# Patient Record
Sex: FEMALE | Race: WHITE | NOT HISPANIC OR LATINO | Employment: FULL TIME | URBAN - METROPOLITAN AREA
[De-identification: names, ages, dates, MRNs, and addresses within clinical notes are randomized per-mention and may not be internally consistent; named-entity substitution may affect disease eponyms.]

---

## 2020-07-04 ENCOUNTER — HOSPITAL ENCOUNTER (EMERGENCY)
Facility: HOSPITAL | Age: 35
Discharge: HOME | End: 2020-07-05
Attending: EMERGENCY MEDICINE | Admitting: EMERGENCY MEDICINE

## 2020-07-04 ENCOUNTER — APPOINTMENT (OUTPATIENT)
Dept: CT IMAGING | Facility: HOSPITAL | Age: 35
End: 2020-07-04

## 2020-07-04 ENCOUNTER — APPOINTMENT (OUTPATIENT)
Dept: GENERAL RADIOLOGY | Facility: HOSPITAL | Age: 35
End: 2020-07-04

## 2020-07-04 DIAGNOSIS — M79.89 LEFT LEG SWELLING: Primary | ICD-10-CM

## 2020-07-04 LAB
ALBUMIN SERPL-MCNC: 3.8 G/DL (ref 3.5–5.2)
ALBUMIN/GLOB SERPL: 1.4 G/DL
ALP SERPL-CCNC: 95 U/L (ref 39–117)
ALT SERPL W P-5'-P-CCNC: 118 U/L (ref 1–33)
ANION GAP SERPL CALCULATED.3IONS-SCNC: 11.8 MMOL/L (ref 5–15)
AST SERPL-CCNC: 61 U/L (ref 1–32)
BASOPHILS # BLD MANUAL: 0.05 10*3/MM3 (ref 0–0.2)
BASOPHILS NFR BLD AUTO: 1 % (ref 0–1.5)
BILIRUB SERPL-MCNC: 0.4 MG/DL (ref 0.2–1.2)
BUN SERPL-MCNC: 26 MG/DL (ref 6–20)
BUN/CREAT SERPL: 19.5 (ref 7–25)
CALCIUM SPEC-SCNC: 8.3 MG/DL (ref 8.6–10.5)
CHLORIDE SERPL-SCNC: 101 MMOL/L (ref 98–107)
CO2 SERPL-SCNC: 25.2 MMOL/L (ref 22–29)
CREAT SERPL-MCNC: 1.33 MG/DL (ref 0.57–1)
DEPRECATED RDW RBC AUTO: 48.6 FL (ref 37–54)
ERYTHROCYTE [DISTWIDTH] IN BLOOD BY AUTOMATED COUNT: 13.7 % (ref 12.3–15.4)
GFR SERPL CREATININE-BSD FRML MDRD: 45 ML/MIN/1.73
GLOBULIN UR ELPH-MCNC: 2.7 GM/DL
GLUCOSE SERPL-MCNC: 121 MG/DL (ref 65–99)
HCT VFR BLD AUTO: 29.9 % (ref 34–46.6)
HGB BLD-MCNC: 10.3 G/DL (ref 12–15.9)
HOLD SPECIMEN: NORMAL
HOLD SPECIMEN: NORMAL
LYMPHOCYTES # BLD MANUAL: 0.93 10*3/MM3 (ref 0.7–3.1)
LYMPHOCYTES NFR BLD MANUAL: 19 % (ref 19.6–45.3)
LYMPHOCYTES NFR BLD MANUAL: 6 % (ref 5–12)
MCH RBC QN AUTO: 33.3 PG (ref 26.6–33)
MCHC RBC AUTO-ENTMCNC: 34.4 G/DL (ref 31.5–35.7)
MCV RBC AUTO: 96.8 FL (ref 79–97)
MONOCYTES # BLD AUTO: 0.29 10*3/MM3 (ref 0.1–0.9)
NEUTROPHILS # BLD AUTO: 3.61 10*3/MM3 (ref 1.7–7)
NEUTROPHILS NFR BLD MANUAL: 74 % (ref 42.7–76)
NT-PROBNP SERPL-MCNC: 407.5 PG/ML (ref 5–450)
PLAT MORPH BLD: NORMAL
PLATELET # BLD AUTO: 230 10*3/MM3 (ref 140–450)
PMV BLD AUTO: 11.7 FL (ref 6–12)
POTASSIUM SERPL-SCNC: 4.1 MMOL/L (ref 3.5–5.2)
PROT SERPL-MCNC: 6.5 G/DL (ref 6–8.5)
RBC # BLD AUTO: 3.09 10*6/MM3 (ref 3.77–5.28)
RBC MORPH BLD: NORMAL
SODIUM SERPL-SCNC: 138 MMOL/L (ref 136–145)
TROPONIN T SERPL-MCNC: <0.01 NG/ML (ref 0–0.03)
WBC # BLD AUTO: 4.88 10*3/MM3 (ref 3.4–10.8)
WBC MORPH BLD: NORMAL
WHOLE BLOOD HOLD SPECIMEN: NORMAL
WHOLE BLOOD HOLD SPECIMEN: NORMAL

## 2020-07-04 PROCEDURE — 25010000002 HYDROMORPHONE PER 4 MG: Performed by: EMERGENCY MEDICINE

## 2020-07-04 PROCEDURE — 93005 ELECTROCARDIOGRAM TRACING: CPT | Performed by: EMERGENCY MEDICINE

## 2020-07-04 PROCEDURE — 96375 TX/PRO/DX INJ NEW DRUG ADDON: CPT

## 2020-07-04 PROCEDURE — 71275 CT ANGIOGRAPHY CHEST: CPT

## 2020-07-04 PROCEDURE — 93010 ELECTROCARDIOGRAM REPORT: CPT | Performed by: INTERNAL MEDICINE

## 2020-07-04 PROCEDURE — 96374 THER/PROPH/DIAG INJ IV PUSH: CPT

## 2020-07-04 PROCEDURE — 85025 COMPLETE CBC W/AUTO DIFF WBC: CPT | Performed by: EMERGENCY MEDICINE

## 2020-07-04 PROCEDURE — 99283 EMERGENCY DEPT VISIT LOW MDM: CPT

## 2020-07-04 PROCEDURE — 85007 BL SMEAR W/DIFF WBC COUNT: CPT | Performed by: EMERGENCY MEDICINE

## 2020-07-04 PROCEDURE — 0 IOPAMIDOL PER 1 ML: Performed by: EMERGENCY MEDICINE

## 2020-07-04 PROCEDURE — 71045 X-RAY EXAM CHEST 1 VIEW: CPT

## 2020-07-04 PROCEDURE — 83880 ASSAY OF NATRIURETIC PEPTIDE: CPT | Performed by: EMERGENCY MEDICINE

## 2020-07-04 PROCEDURE — 80053 COMPREHEN METABOLIC PANEL: CPT | Performed by: EMERGENCY MEDICINE

## 2020-07-04 PROCEDURE — 84484 ASSAY OF TROPONIN QUANT: CPT | Performed by: EMERGENCY MEDICINE

## 2020-07-04 PROCEDURE — 25010000002 ONDANSETRON PER 1 MG: Performed by: EMERGENCY MEDICINE

## 2020-07-04 RX ORDER — NAPROXEN 500 MG/1
400 TABLET ORAL 2 TIMES DAILY WITH MEALS
COMMUNITY
End: 2023-01-25

## 2020-07-04 RX ORDER — HYDROMORPHONE HYDROCHLORIDE 1 MG/ML
0.5 INJECTION, SOLUTION INTRAMUSCULAR; INTRAVENOUS; SUBCUTANEOUS ONCE
Status: COMPLETED | OUTPATIENT
Start: 2020-07-04 | End: 2020-07-04

## 2020-07-04 RX ORDER — OXYCODONE AND ACETAMINOPHEN 10; 325 MG/1; MG/1
1 TABLET ORAL EVERY 6 HOURS PRN
COMMUNITY
End: 2021-06-29

## 2020-07-04 RX ORDER — ASPIRIN 81 MG/1
81 TABLET ORAL DAILY
COMMUNITY
End: 2021-06-29

## 2020-07-04 RX ORDER — SODIUM CHLORIDE 0.9 % (FLUSH) 0.9 %
10 SYRINGE (ML) INJECTION AS NEEDED
Status: DISCONTINUED | OUTPATIENT
Start: 2020-07-04 | End: 2020-07-05 | Stop reason: HOSPADM

## 2020-07-04 RX ORDER — CEPHALEXIN 250 MG/1
250 CAPSULE ORAL 4 TIMES DAILY
COMMUNITY
End: 2021-06-29

## 2020-07-04 RX ORDER — ONDANSETRON 2 MG/ML
4 INJECTION INTRAMUSCULAR; INTRAVENOUS ONCE
Status: COMPLETED | OUTPATIENT
Start: 2020-07-04 | End: 2020-07-04

## 2020-07-04 RX ORDER — LEVOTHYROXINE SODIUM 0.05 MG/1
50 TABLET ORAL DAILY
COMMUNITY
End: 2023-01-25

## 2020-07-04 RX ADMIN — IOPAMIDOL 95 ML: 755 INJECTION, SOLUTION INTRAVENOUS at 23:25

## 2020-07-04 RX ADMIN — HYDROMORPHONE HYDROCHLORIDE 0.5 MG: 1 INJECTION, SOLUTION INTRAMUSCULAR; INTRAVENOUS; SUBCUTANEOUS at 23:34

## 2020-07-04 RX ADMIN — ONDANSETRON 4 MG: 2 INJECTION INTRAMUSCULAR; INTRAVENOUS at 23:32

## 2020-07-05 VITALS
TEMPERATURE: 97 F | BODY MASS INDEX: 33.55 KG/M2 | HEIGHT: 60 IN | WEIGHT: 170.9 LBS | OXYGEN SATURATION: 100 % | SYSTOLIC BLOOD PRESSURE: 129 MMHG | HEART RATE: 92 BPM | DIASTOLIC BLOOD PRESSURE: 85 MMHG | RESPIRATION RATE: 16 BRPM

## 2020-07-05 PROCEDURE — 96372 THER/PROPH/DIAG INJ SC/IM: CPT

## 2020-07-05 PROCEDURE — 25010000002 FUROSEMIDE PER 20 MG: Performed by: EMERGENCY MEDICINE

## 2020-07-05 PROCEDURE — 96375 TX/PRO/DX INJ NEW DRUG ADDON: CPT

## 2020-07-05 PROCEDURE — 25010000002 ENOXAPARIN PER 10 MG: Performed by: EMERGENCY MEDICINE

## 2020-07-05 RX ORDER — FUROSEMIDE 10 MG/ML
20 INJECTION INTRAMUSCULAR; INTRAVENOUS ONCE
Status: COMPLETED | OUTPATIENT
Start: 2020-07-05 | End: 2020-07-05

## 2020-07-05 RX ORDER — FUROSEMIDE 20 MG/1
20 TABLET ORAL DAILY
Qty: 5 TABLET | Refills: 0 | Status: SHIPPED | OUTPATIENT
Start: 2020-07-05 | End: 2021-06-29

## 2020-07-05 RX ADMIN — ENOXAPARIN SODIUM 80 MG: 80 INJECTION SUBCUTANEOUS at 00:15

## 2020-07-05 RX ADMIN — FUROSEMIDE 20 MG: 10 INJECTION, SOLUTION INTRAMUSCULAR; INTRAVENOUS at 00:15

## 2020-07-05 NOTE — ED PROVIDER NOTES
" EMERGENCY DEPARTMENT ENCOUNTER    Room Number:  16  Date of encounter:  2020  PCP: Moraima Goyal PA  Historian: patient      HPI:  Chief Complaint: knee pain and swelling  Context: Stephanie Appiah is a 35 y.o. female who presents to the ED c/o a marked change to L knee pain and swelling that began earlier today. Per the pt, she underwent ACL surgery by Dr. Heart 3 days ago. She states that her pain was as expected until earlier today when the pain worsened and she began to notice a large amount of swelling. She states \"I gained about 17 pounds in just a few hours, it also feels really tight in my abd and in my hand\". Confirms mild SOA. Denies CP, fever, chills, nausea, vomiting, and other pain. Currently on 81 mg ASA, but no other blood thinners. There are no other complaints.     Patient was placed in face mask in first look. Patient was wearing facemask when I entered the room and throughout our encounter. I wore full protective equipment throughout this patient encounter including a face mask, and gloves. Hand hygiene was performed before donning protective equipment and after removal when leaving the room.    PAST MEDICAL HISTORY  Active Ambulatory Problems     Diagnosis Date Noted   • No Active Ambulatory Problems     Resolved Ambulatory Problems     Diagnosis Date Noted   • No Resolved Ambulatory Problems     Past Medical History:   Diagnosis Date   • ADHD    • Disease of thyroid gland          PAST SURGICAL HISTORY  Past Surgical History:   Procedure Laterality Date   • BREAST AUGMENTATION     •  SECTION     • KNEE SURGERY           FAMILY HISTORY  Family History   Problem Relation Age of Onset   • Ovarian cancer Maternal Aunt    • Breast cancer Maternal Grandmother    • No Known Problems Mother    • No Known Problems Father          SOCIAL HISTORY  Social History     Socioeconomic History   • Marital status:      Spouse name: Not on file   • Number of children: 1   • Years " of education: Not on file   • Highest education level: Not on file   Occupational History   • Occupation: teacher   Tobacco Use   • Smoking status: Never Smoker   • Smokeless tobacco: Never Used   Substance and Sexual Activity   • Alcohol use: Yes     Comment: occ   • Drug use: No   • Sexual activity: Yes     Partners: Male         ALLERGIES  Insulin detemir        REVIEW OF SYSTEMS  Review of Systems   Constitutional: Positive for unexpected weight change (gain of 17 pounds in a few hours). Negative for fever.   HENT: Negative for sore throat.    Eyes: Negative.    Respiratory: Positive for shortness of breath. Negative for cough.    Cardiovascular: Negative for chest pain.   Gastrointestinal: Negative for abdominal pain, diarrhea and vomiting.   Genitourinary: Negative for dysuria.   Musculoskeletal: Negative for neck pain.        (+) L knee pain and swelling   Skin: Negative for rash.   Allergic/Immunologic: Negative.    Neurological: Negative for weakness, numbness and headaches.   Hematological: Negative.    Psychiatric/Behavioral: Negative.    All other systems reviewed and are negative.     All systems reviewed and negative except for those discussed in HPI.       PHYSICAL EXAM    I have reviewed the triage vital signs and nursing notes.    ED Triage Vitals   Temp Heart Rate Resp BP SpO2   07/04/20 2059 07/04/20 2059 07/04/20 2059 07/04/20 2130 07/04/20 2059   97 °F (36.1 °C) 100 16 134/84 100 %      Temp src Heart Rate Source Patient Position BP Location FiO2 (%)   07/04/20 2059 07/04/20 2059 -- -- --   Tympanic Monitor            Physical Exam   Constitutional: She is oriented to person, place, and time. No distress.   HENT:   Head: Normocephalic and atraumatic.   Eyes: Pupils are equal, round, and reactive to light. EOM are normal.   Neck: Normal range of motion. Neck supple.   Cardiovascular: Normal rate, regular rhythm and normal heart sounds.   Pulmonary/Chest: Effort normal and breath sounds normal. No  respiratory distress.   Abdominal: Soft. There is no tenderness. There is no rebound and no guarding.   Musculoskeletal: She exhibits no edema.        Left knee: She exhibits decreased range of motion and swelling.   Mild swelling of the LLE. The anterior surgical incision of the knee appears to be intact without drainage or erythema. The distal sensation is intact.    Neurological: She is alert and oriented to person, place, and time. She has normal sensation and normal strength.   Skin: Skin is warm and dry. No rash noted.   Psychiatric: Mood and affect normal.   Nursing note and vitals reviewed.      LAB RESULTS  Recent Results (from the past 24 hour(s))   Comprehensive Metabolic Panel    Collection Time: 07/04/20 10:04 PM   Result Value Ref Range    Glucose 121 (H) 65 - 99 mg/dL    BUN 26 (H) 6 - 20 mg/dL    Creatinine 1.33 (H) 0.57 - 1.00 mg/dL    Sodium 138 136 - 145 mmol/L    Potassium 4.1 3.5 - 5.2 mmol/L    Chloride 101 98 - 107 mmol/L    CO2 25.2 22.0 - 29.0 mmol/L    Calcium 8.3 (L) 8.6 - 10.5 mg/dL    Total Protein 6.5 6.0 - 8.5 g/dL    Albumin 3.80 3.50 - 5.20 g/dL    ALT (SGPT) 118 (H) 1 - 33 U/L    AST (SGOT) 61 (H) 1 - 32 U/L    Alkaline Phosphatase 95 39 - 117 U/L    Total Bilirubin 0.4 0.2 - 1.2 mg/dL    eGFR Non African Amer 45 (L) >60 mL/min/1.73    Globulin 2.7 gm/dL    A/G Ratio 1.4 g/dL    BUN/Creatinine Ratio 19.5 7.0 - 25.0    Anion Gap 11.8 5.0 - 15.0 mmol/L   BNP    Collection Time: 07/04/20 10:04 PM   Result Value Ref Range    proBNP 407.5 5.0 - 450.0 pg/mL   Troponin    Collection Time: 07/04/20 10:04 PM   Result Value Ref Range    Troponin T <0.010 0.000 - 0.030 ng/mL   Light Blue Top    Collection Time: 07/04/20 10:04 PM   Result Value Ref Range    Extra Tube hold for add-on    Green Top (Gel)    Collection Time: 07/04/20 10:04 PM   Result Value Ref Range    Extra Tube Hold for add-ons.    Lavender Top    Collection Time: 07/04/20 10:04 PM   Result Value Ref Range    Extra Tube hold  for add-on    Gold Top - SST    Collection Time: 07/04/20 10:04 PM   Result Value Ref Range    Extra Tube Hold for add-ons.    CBC Auto Differential    Collection Time: 07/04/20 10:04 PM   Result Value Ref Range    WBC 4.88 3.40 - 10.80 10*3/mm3    RBC 3.09 (L) 3.77 - 5.28 10*6/mm3    Hemoglobin 10.3 (L) 12.0 - 15.9 g/dL    Hematocrit 29.9 (L) 34.0 - 46.6 %    MCV 96.8 79.0 - 97.0 fL    MCH 33.3 (H) 26.6 - 33.0 pg    MCHC 34.4 31.5 - 35.7 g/dL    RDW 13.7 12.3 - 15.4 %    RDW-SD 48.6 37.0 - 54.0 fl    MPV 11.7 6.0 - 12.0 fL    Platelets 230 140 - 450 10*3/mm3   Manual Differential    Collection Time: 07/04/20 10:04 PM   Result Value Ref Range    Neutrophil % 74.0 42.7 - 76.0 %    Lymphocyte % 19.0 (L) 19.6 - 45.3 %    Monocyte % 6.0 5.0 - 12.0 %    Basophil % 1.0 0.0 - 1.5 %    Neutrophils Absolute 3.61 1.70 - 7.00 10*3/mm3    Lymphocytes Absolute 0.93 0.70 - 3.10 10*3/mm3    Monocytes Absolute 0.29 0.10 - 0.90 10*3/mm3    Basophils Absolute 0.05 0.00 - 0.20 10*3/mm3    RBC Morphology Normal Normal    WBC Morphology Normal Normal    Platelet Morphology Normal Normal       Ordered the above labs and independently reviewed the results.        RADIOLOGY  Xr Chest 1 View    Result Date: 7/4/2020  PORTABLE CHEST 07/04/2020 AT 10:17 PM  CLINICAL HISTORY: Dyspnea  The lungs are fairly well-expanded and appear free of acute infiltrates. There are no pleural effusions. The cardiomediastinal silhouette is unremarkable.  IMPRESSIONS: No evidence of acute disease within the chest.  This report was finalized on 7/4/2020 10:54 PM by Dr. Dylan Lei M.D.      Ct Angiogram Chest    Result Date: 7/4/2020  CT ANGIOGRAM CHEST-  CLINICAL HISTORY: Dyspnea. Leg swelling. Recent knee surgery.  TECHNIQUE: Spiral CT images were obtained through the chest during rapid IV injection of contrast and were reconstructed in 2 mm thick axial slices. Multiple coronal and sagittal and 3-D reconstructions were produced.  Radiation dose reduction  techniques were utilized, including automated exposure control and exposure modulation based on body size.  COMPARISON: None  FINDINGS: The main pulmonary arteries and their lobar and segmental branches are well opacified, and appear within normal limits. There is no CT evidence of pulmonary embolism. The thoracic aorta is also well opacified and appears within normal limits. There is no mediastinal or hilar or axillary lymphadenopathy. Lung window images demonstrate no parenchymal masses or infiltrates. There are no pleural effusions. Images through the upper abdomen demonstrate no abnormalities.      Negative CTA of the chest with PE protocol.  This report was finalized on 7/4/2020 11:55 PM by Dr. Dylan Lei M.D.        I ordered the above noted radiological studies. Reviewed by me.  See dictation for official radiology interpretation.      PROCEDURES    Procedures    EKG          EKG time: 2213  Rhythm/Rate: NSR 84  P waves and MO: nml  QRS, axis: nml   ST and T waves: nml     Interpreted Contemporaneously by me, independently viewed  No prior       MEDICATIONS GIVEN IN ER    Medications   HYDROmorphone (DILAUDID) injection 0.5 mg (0.5 mg Intravenous Given 7/4/20 2334)   ondansetron (ZOFRAN) injection 4 mg (4 mg Intravenous Given 7/4/20 2332)   iopamidol (ISOVUE-370) 76 % injection 100 mL (95 mL Intravenous Given by Other 7/4/20 2325)   furosemide (LASIX) injection 20 mg (20 mg Intravenous Given 7/5/20 0015)   enoxaparin (LOVENOX) syringe 80 mg (80 mg Subcutaneous Given 7/5/20 0015)         PROGRESS, DATA ANALYSIS, CONSULTS, AND MEDICAL DECISION MAKING    All labs have been independently reviewed by me.  All radiology studies have been reviewed by me and discussed with radiologist dictating the report.   EKG's independently viewed and interpreted by me.  Discussion below represents my analysis of pertinent findings related to patient's condition, differential diagnosis, treatment plan and final  disposition.         0004- Rechecked pt. Pt is resting comfortably. Her pain is greatly improved. She also feels like her weight and swelling are improving. Notified pt of her labs, CXR, EKG, and CTA chest results; all of which are unremarkable. Given that I cannot obtain a Doppler of her LLE at this time, I informed the pt I will be giving her a dose of Lovenox tonight and will then discharge her to f/u to get the doppler tomorrow. Discussed the plan to discharge the pt home with prescriptions for Lasix. I instructed the pt to f/u for her doppler tomorrow.. RTED instructions given. Pt understands and agrees with the plan, all questions answered.    --  AS OF 06:29 VITALS:    BP - 129/85  HR - 92  TEMP - 97 °F (36.1 °C) (Tympanic)  O2 SATS - 100%  --    DIAGNOSIS  Final diagnoses:   Left leg swelling         DISPOSITION  DISCHARGE    Patient discharged in stable condition.    Reviewed implications of results, diagnosis, meds, responsibility to follow up, warning signs and symptoms of possible worsening, potential complications and reasons to return to ER.    Patient/Family voiced understanding of above instructions.    Discussed plan for discharge, as there is no emergent indication for admission. Patient referred to primary care provider for BP management due to today's BP. Pt/family is agreeable and understands need for follow up and repeat testing.  Pt is aware that discharge does not mean that nothing is wrong but it indicates no emergency is present that requires admission and they must continue care with follow-up as given below or physician of their choice.     FOLLOW-UP  oMraima Goyal PA  [address] [place] [id number] [phone]    Schedule an appointment as soon as possible for a visit            Medication List      New Prescriptions    furosemide 20 MG tablet  Commonly known as:  LASIX  Take 1 tablet by mouth Daily.        --  Documentation assistance provided by gerber Warren  Caleb for Dr. Татьяна MD.  Information recorded by the scribe was done at my direction and has been verified and validated by me.       Kelvin Ellis  07/05/20 0011       Jl Vaz MD  07/05/20 0629

## 2020-07-05 NOTE — ED NOTES
"Pt placed in mask upon arrival, staff in PPE. Pt reports increased pain in left knee s/p sx 3 days ago. Pt says she has 17 pound weight gain \"in a matter of just a few hours\". Increase in pain and she states she is SOB. Dr. Armin Han was surgeon.     Marcie Ward RN  07/04/20 2054    "

## 2021-04-16 ENCOUNTER — BULK ORDERING (OUTPATIENT)
Dept: CASE MANAGEMENT | Facility: OTHER | Age: 36
End: 2021-04-16

## 2021-04-16 DIAGNOSIS — Z23 IMMUNIZATION DUE: ICD-10-CM

## 2021-06-29 ENCOUNTER — OFFICE VISIT (OUTPATIENT)
Dept: FAMILY MEDICINE CLINIC | Facility: CLINIC | Age: 36
End: 2021-06-29

## 2021-06-29 VITALS
HEIGHT: 60 IN | HEART RATE: 98 BPM | TEMPERATURE: 100 F | OXYGEN SATURATION: 95 % | BODY MASS INDEX: 27.09 KG/M2 | SYSTOLIC BLOOD PRESSURE: 132 MMHG | WEIGHT: 138 LBS | DIASTOLIC BLOOD PRESSURE: 72 MMHG

## 2021-06-29 DIAGNOSIS — E03.9 HYPOTHYROIDISM, UNSPECIFIED TYPE: Chronic | ICD-10-CM

## 2021-06-29 DIAGNOSIS — F98.8 ATTENTION DEFICIT DISORDER (ADD) IN ADULT: Primary | Chronic | ICD-10-CM

## 2021-06-29 DIAGNOSIS — R00.0 TACHYCARDIA: ICD-10-CM

## 2021-06-29 PROBLEM — O14.10 PREECLAMPSIA, SEVERE: Status: ACTIVE | Noted: 2017-06-10

## 2021-06-29 PROBLEM — O13.9 PIH (PREGNANCY INDUCED HYPERTENSION): Status: ACTIVE | Noted: 2017-06-06

## 2021-06-29 PROCEDURE — 99204 OFFICE O/P NEW MOD 45 MIN: CPT | Performed by: FAMILY MEDICINE

## 2021-06-29 NOTE — PATIENT INSTRUCTIONS
For the tachycardia or high heart rate, will review records from her the previous provider's office including EKGs.  We will need to review old records for ADD testing and repeat them if they are not available.  Follow-up in 3 months for physical and recheck.

## 2021-06-29 NOTE — PROGRESS NOTES
"Chief Complaint  Establish Care    Subjective          Stephanie Appiah presents to [facility]  Here to establish.  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  Her previous office does not take Medicaid.  Has been on Adderall for ADD.  Was tested as an adult.  Started with Concerta in college.  She feels that it really helps her function.  She had problems with productivity when she was off of it during pregnancies.  Stopped it when she was pregnant 10 and 4 years ago.  Will be going to school for aesthetics.  He generally feels well.  She states she has whitecoat syndrome.  She states that she high heart rate worked up before by Macy Goyal PA-C.        Objective   Vital Signs:   /72   Pulse 98   Temp 100 °F (37.8 °C)   Ht 152.4 cm (60\")   Wt 62.6 kg (138 lb)   SpO2 95%   BMI 26.95 kg/m²     Physical Exam  Constitutional:       General: She is not in acute distress.     Appearance: Normal appearance. She is well-developed.   HENT:      Head: Normocephalic and atraumatic.      Right Ear: Tympanic membrane, ear canal and external ear normal.      Left Ear: Tympanic membrane, ear canal and external ear normal.      Mouth/Throat:      Mouth: Mucous membranes are moist.      Pharynx: Oropharynx is clear.   Eyes:      Conjunctiva/sclera: Conjunctivae normal.      Pupils: Pupils are equal, round, and reactive to light.   Neck:      Thyroid: No thyromegaly.   Cardiovascular:      Rate and Rhythm: Regular rhythm. Tachycardia present.      Heart sounds: No murmur heard.     Pulmonary:      Effort: Pulmonary effort is normal.      Breath sounds: Normal breath sounds. No wheezing.   Abdominal:      General: Bowel sounds are normal.      Palpations: Abdomen is soft.      Tenderness: There is no abdominal tenderness.   Musculoskeletal:         General: Normal range of motion.      Cervical back: Neck supple.   Lymphadenopathy:      Cervical: No cervical adenopathy. "   Skin:     General: Skin is warm and dry.   Neurological:      Mental Status: She is alert and oriented to person, place, and time.   Psychiatric:         Mood and Affect: Mood normal.         Behavior: Behavior normal.        Result Review :                 Assessment and Plan    Diagnoses and all orders for this visit:    1. Attention deficit disorder (ADD) in adult (Primary)    2. Hypothyroidism, unspecified type    3. Tachycardia    ADHD-discussed with patient that I would continue her treatment or try lower dose because the tachycardia if I got all her old records that showed her drug screen testing.  She would need a contract at that time.  Hypothyroidism-check old records  Tachycardia-resolved on waiting some time with the patient, will check old records for previous EKGs.  This is either from her medication or from her anxiety whitecoat syndrome (She states she has had it looked up before)    Follow Up   Return in about 3 months (around 9/29/2021) for Annual physical.  Patient was given instructions and counseling regarding her condition or for health maintenance advice. Please see specific information pulled into the AVS if appropriate.

## 2021-07-16 RX ORDER — DEXTROAMPHETAMINE SACCHARATE, AMPHETAMINE ASPARTATE MONOHYDRATE, DEXTROAMPHETAMINE SULFATE AND AMPHETAMINE SULFATE 5; 5; 5; 5 MG/1; MG/1; MG/1; MG/1
20 CAPSULE, EXTENDED RELEASE ORAL EVERY MORNING
Refills: 0 | Status: CANCELLED | OUTPATIENT
Start: 2021-07-16

## 2021-07-16 NOTE — TELEPHONE ENCOUNTER
Dr. Kehrer has not prescribed this controlled substance for this new patient yet.  Her last note stated there were some requirements that the patient had to go through in order for Dr. Kehrer to prescribe it.  Please forward this refill request on to Dr. Kehrer since it is Friday and she will be back on Monday.

## 2021-07-16 NOTE — TELEPHONE ENCOUNTER
Caller: Stephanie Appiah    Relationship: Self    Best call back number: [phone]    Medication needed:   Requested Prescriptions     Pending Prescriptions Disp Refills   • amphetamine-dextroamphetamine XR (ADDERALL XR) 20 MG 24 hr capsule  0     Sig: Take 1 capsule by mouth Every Morning       When do you need the refill by: 07/16 ASAP     What additional details did the patient provide when requesting the medication: PATIENT IS COMPLETELY OUT     Does the patient have less than a 3 day supply:  [x] Yes  [] No    What is the patient's preferred pharmacy: [facility] [address] - [phone]  - [phone] FX

## 2021-07-18 NOTE — TELEPHONE ENCOUNTER
Records with ADD testing have not been received from her previous MD. When that happens, she will need a contract.

## 2021-08-04 ENCOUNTER — TELEPHONE (OUTPATIENT)
Dept: FAMILY MEDICINE CLINIC | Facility: CLINIC | Age: 36
End: 2021-08-04

## 2021-08-04 DIAGNOSIS — F98.8 ATTENTION DEFICIT DISORDER (ADD) IN ADULT: Primary | ICD-10-CM

## 2021-08-04 NOTE — TELEPHONE ENCOUNTER
PATIENT STATES:THAT SHE WOULD LIKE TO SEE HOW SHE CAN GET TESTED FOR ADHD PLEASE ADVISE      PATIENT CAN BE REACHED ON: [phone]

## 2021-08-31 ENCOUNTER — TELEPHONE (OUTPATIENT)
Dept: FAMILY MEDICINE CLINIC | Facility: CLINIC | Age: 36
End: 2021-08-31

## 2021-08-31 NOTE — TELEPHONE ENCOUNTER
PATIENT CALLING IN REGARDS TO SPEAK WITH DR KEHRER ABOUT HER ADHD MEDICATION . PATIENT STATES THAT THERE ARE NO APPOINTMENT AT THE FACILITY IN WHICH SHE WAS REFERRED. PLEASE ADVISE THANK YOU!

## 2021-08-31 NOTE — TELEPHONE ENCOUNTER
No answer. LM. Advised pt that her PCP is out of the office this week. And if she calls back we would be more than happy to set her up with an available provider

## 2021-10-06 ENCOUNTER — OFFICE VISIT (OUTPATIENT)
Dept: FAMILY MEDICINE CLINIC | Facility: CLINIC | Age: 36
End: 2021-10-06

## 2021-10-06 VITALS
BODY MASS INDEX: 28.16 KG/M2 | TEMPERATURE: 97.1 F | WEIGHT: 143.4 LBS | OXYGEN SATURATION: 98 % | SYSTOLIC BLOOD PRESSURE: 136 MMHG | HEART RATE: 80 BPM | DIASTOLIC BLOOD PRESSURE: 84 MMHG | HEIGHT: 60 IN

## 2021-10-06 DIAGNOSIS — F98.8 ATTENTION DEFICIT DISORDER (ADD) IN ADULT: Primary | ICD-10-CM

## 2021-10-06 DIAGNOSIS — E03.9 HYPOTHYROIDISM, UNSPECIFIED TYPE: ICD-10-CM

## 2021-10-06 PROCEDURE — 99213 OFFICE O/P EST LOW 20 MIN: CPT | Performed by: NURSE PRACTITIONER

## 2021-10-06 NOTE — PROGRESS NOTES
"Chief Complaint  ADD, Med Refill, and Hypothyroidism    Subjective          Stephanie Appiah presents to [facility]  History of Present Illness     Patient is a new patient of Dr. Meredith Kehrer.  This is my first time seeing this patient.  She presents today for follow-up on her ADHD.  Dr. Kehrer was reviewing medical records from previous provider.  It appears she has been on this, however it does not appear that there are any record of her previous testing.  She was referred to specialty for testing but were called back and reported she could not get an appointment with them until February, 2022.  She states she couldn't wait so she paid out of pocket last month with them.    She had been on adderall 20mg most recently.     She states she had the testing with [facility] in around 2007 when she moved here.    She wasn't sure why it wasn't on record.  She hasn't had it since Thursday last week.     She is also following up on her thyroid medication.  Last check at [facility] was June, 2021 and was within normal limits.  Denies any issues with medication.     Has been well controlled on 50mcg for at least a year.      Has no other complaints today.          Objective   Vital Signs:   /84   Pulse 80   Temp 97.1 °F (36.2 °C)   Ht 152.4 cm (60\")   Wt 65 kg (143 lb 6.4 oz)   SpO2 98%   BMI 28.01 kg/m²     Physical Exam  Constitutional:       Appearance: Normal appearance.   Pulmonary:      Effort: Pulmonary effort is normal.   Skin:     General: Skin is warm and dry.   Neurological:      Mental Status: She is alert.   Psychiatric:         Mood and Affect: Mood normal.         Behavior: Behavior normal.         Thought Content: Thought content normal.         Judgment: Judgment normal.        Result Review :                 Assessment and Plan    Diagnoses and all orders for this visit:    1. Attention deficit disorder (ADD) in adult (Primary)    2. Hypothyroidism, " unspecified type      After discussion with Dr. Kehrer she does feel comfortable initiating Adderall without evaluation from psychiatry and with her seeing her previous PCP after she told her she was not comfortable without records and with her blood pressure and heart rate being elevated.  I have sent message for this to be discussed by medical assistant.      Follow Up   No follow-ups on file.  Patient was given instructions and counseling regarding her condition or for health maintenance advice. Please see specific information pulled into the AVS if appropriate.

## 2025-05-24 ENCOUNTER — APPOINTMENT (OUTPATIENT)
Dept: CT IMAGING | Facility: HOSPITAL | Age: 40
End: 2025-05-24
Payer: COMMERCIAL

## 2025-05-24 ENCOUNTER — HOSPITAL ENCOUNTER (INPATIENT)
Facility: HOSPITAL | Age: 40
LOS: 2 days | Discharge: HOME | End: 2025-05-26
Attending: STUDENT IN AN ORGANIZED HEALTH CARE EDUCATION/TRAINING PROGRAM | Admitting: HOSPITALIST
Payer: COMMERCIAL

## 2025-05-24 DIAGNOSIS — K85.90 ACUTE PANCREATITIS, UNSPECIFIED COMPLICATION STATUS, UNSPECIFIED PANCREATITIS TYPE: Primary | ICD-10-CM

## 2025-05-24 PROBLEM — R07.81 RIB PAIN ON LEFT SIDE: Status: ACTIVE | Noted: 2025-05-24

## 2025-05-24 LAB
ALBUMIN SERPL-MCNC: 3.7 G/DL (ref 3.5–5.2)
ALBUMIN/GLOB SERPL: 1 G/DL
ALP SERPL-CCNC: 184 U/L (ref 39–117)
ALT SERPL W P-5'-P-CCNC: 22 U/L (ref 1–33)
ANION GAP SERPL CALCULATED.3IONS-SCNC: 10.7 MMOL/L (ref 5–15)
ANION GAP SERPL CALCULATED.3IONS-SCNC: 11.1 MMOL/L (ref 5–15)
AST SERPL-CCNC: 21 U/L (ref 1–32)
BASOPHILS # BLD AUTO: 0.01 10*3/MM3 (ref 0–0.2)
BASOPHILS NFR BLD AUTO: 0.2 % (ref 0–1.5)
BILIRUB SERPL-MCNC: 0.4 MG/DL (ref 0–1.2)
BUN SERPL-MCNC: 15 MG/DL (ref 6–20)
BUN SERPL-MCNC: 18 MG/DL (ref 6–20)
BUN/CREAT SERPL: 10.5 (ref 7–25)
BUN/CREAT SERPL: 9.6 (ref 7–25)
CALCIUM SPEC-SCNC: 8.7 MG/DL (ref 8.6–10.5)
CALCIUM SPEC-SCNC: 8.9 MG/DL (ref 8.6–10.5)
CHLORIDE SERPL-SCNC: 102 MMOL/L (ref 98–107)
CHLORIDE SERPL-SCNC: 103 MMOL/L (ref 98–107)
CK SERPL-CCNC: 39 U/L (ref 20–180)
CO2 SERPL-SCNC: 22.9 MMOL/L (ref 22–29)
CO2 SERPL-SCNC: 24.3 MMOL/L (ref 22–29)
CREAT SERPL-MCNC: 1.56 MG/DL (ref 0.57–1)
CREAT SERPL-MCNC: 1.71 MG/DL (ref 0.57–1)
DEPRECATED RDW RBC AUTO: 49.2 FL (ref 37–54)
EGFRCR SERPLBLD CKD-EPI 2021: 38.2 ML/MIN/1.73
EGFRCR SERPLBLD CKD-EPI 2021: 42.7 ML/MIN/1.73
EOSINOPHIL # BLD AUTO: 0.02 10*3/MM3 (ref 0–0.4)
EOSINOPHIL NFR BLD AUTO: 0.3 % (ref 0.3–6.2)
ERYTHROCYTE [DISTWIDTH] IN BLOOD BY AUTOMATED COUNT: 13.5 % (ref 12.3–15.4)
ETHANOL BLD-MCNC: <10 MG/DL (ref 0–10)
ETHANOL UR QL: <0.01 %
GLOBULIN UR ELPH-MCNC: 3.8 GM/DL
GLUCOSE SERPL-MCNC: 103 MG/DL (ref 65–99)
GLUCOSE SERPL-MCNC: 168 MG/DL (ref 65–99)
HCG SERPL QL: NEGATIVE
HCT VFR BLD AUTO: 32.6 % (ref 34–46.6)
HGB BLD-MCNC: 10.9 G/DL (ref 12–15.9)
IMM GRANULOCYTES # BLD AUTO: 0.31 10*3/MM3 (ref 0–0.05)
IMM GRANULOCYTES NFR BLD AUTO: 4.8 % (ref 0–0.5)
LIPASE SERPL-CCNC: 41 U/L (ref 13–60)
LYMPHOCYTES # BLD AUTO: 0.97 10*3/MM3 (ref 0.7–3.1)
LYMPHOCYTES NFR BLD AUTO: 14.9 % (ref 19.6–45.3)
MAGNESIUM SERPL-MCNC: 1.2 MG/DL (ref 1.6–2.6)
MAGNESIUM SERPL-MCNC: 1.9 MG/DL (ref 1.6–2.6)
MCH RBC QN AUTO: 33.2 PG (ref 26.6–33)
MCHC RBC AUTO-ENTMCNC: 33.4 G/DL (ref 31.5–35.7)
MCV RBC AUTO: 99.4 FL (ref 79–97)
MONOCYTES # BLD AUTO: 0.57 10*3/MM3 (ref 0.1–0.9)
MONOCYTES NFR BLD AUTO: 8.8 % (ref 5–12)
NEUTROPHILS NFR BLD AUTO: 4.61 10*3/MM3 (ref 1.7–7)
NEUTROPHILS NFR BLD AUTO: 71 % (ref 42.7–76)
PHOSPHATE SERPL-MCNC: 1.7 MG/DL (ref 2.5–4.5)
PLATELET # BLD AUTO: 152 10*3/MM3 (ref 140–450)
PMV BLD AUTO: 10.8 FL (ref 6–12)
POTASSIUM SERPL-SCNC: 2.8 MMOL/L (ref 3.5–5.2)
POTASSIUM SERPL-SCNC: 3.4 MMOL/L (ref 3.5–5.2)
PROT SERPL-MCNC: 7.5 G/DL (ref 6–8.5)
RBC # BLD AUTO: 3.28 10*6/MM3 (ref 3.77–5.28)
SODIUM SERPL-SCNC: 137 MMOL/L (ref 136–145)
SODIUM SERPL-SCNC: 137 MMOL/L (ref 136–145)
WBC NRBC COR # BLD AUTO: 6.49 10*3/MM3 (ref 3.4–10.8)

## 2025-05-24 PROCEDURE — 25010000002 MAGNESIUM SULFATE 2 GM/50ML SOLUTION: Performed by: STUDENT IN AN ORGANIZED HEALTH CARE EDUCATION/TRAINING PROGRAM

## 2025-05-24 PROCEDURE — 25010000002 THIAMINE PER 100 MG: Performed by: HOSPITALIST

## 2025-05-24 PROCEDURE — 99285 EMERGENCY DEPT VISIT HI MDM: CPT | Performed by: STUDENT IN AN ORGANIZED HEALTH CARE EDUCATION/TRAINING PROGRAM

## 2025-05-24 PROCEDURE — 25010000002 POTASSIUM CHLORIDE 10 MEQ/100ML SOLUTION: Performed by: STUDENT IN AN ORGANIZED HEALTH CARE EDUCATION/TRAINING PROGRAM

## 2025-05-24 PROCEDURE — 83690 ASSAY OF LIPASE: CPT | Performed by: STUDENT IN AN ORGANIZED HEALTH CARE EDUCATION/TRAINING PROGRAM

## 2025-05-24 PROCEDURE — 25810000003 SODIUM CHLORIDE 0.9 % SOLUTION: Performed by: HOSPITALIST

## 2025-05-24 PROCEDURE — 74176 CT ABD & PELVIS W/O CONTRAST: CPT

## 2025-05-24 PROCEDURE — 82077 ASSAY SPEC XCP UR&BREATH IA: CPT | Performed by: HOSPITALIST

## 2025-05-24 PROCEDURE — 25810000003 SODIUM CHLORIDE 0.9 % SOLUTION: Performed by: STUDENT IN AN ORGANIZED HEALTH CARE EDUCATION/TRAINING PROGRAM

## 2025-05-24 PROCEDURE — 99285 EMERGENCY DEPT VISIT HI MDM: CPT

## 2025-05-24 PROCEDURE — 84703 CHORIONIC GONADOTROPIN ASSAY: CPT | Performed by: STUDENT IN AN ORGANIZED HEALTH CARE EDUCATION/TRAINING PROGRAM

## 2025-05-24 PROCEDURE — 84100 ASSAY OF PHOSPHORUS: CPT | Performed by: HOSPITALIST

## 2025-05-24 PROCEDURE — 82550 ASSAY OF CK (CPK): CPT | Performed by: HOSPITALIST

## 2025-05-24 PROCEDURE — 85025 COMPLETE CBC W/AUTO DIFF WBC: CPT | Performed by: STUDENT IN AN ORGANIZED HEALTH CARE EDUCATION/TRAINING PROGRAM

## 2025-05-24 PROCEDURE — 80053 COMPREHEN METABOLIC PANEL: CPT | Performed by: STUDENT IN AN ORGANIZED HEALTH CARE EDUCATION/TRAINING PROGRAM

## 2025-05-24 PROCEDURE — 83735 ASSAY OF MAGNESIUM: CPT | Performed by: HOSPITALIST

## 2025-05-24 PROCEDURE — 25010000002 HYDROMORPHONE PER 4 MG: Performed by: STUDENT IN AN ORGANIZED HEALTH CARE EDUCATION/TRAINING PROGRAM

## 2025-05-24 PROCEDURE — 83735 ASSAY OF MAGNESIUM: CPT | Performed by: STUDENT IN AN ORGANIZED HEALTH CARE EDUCATION/TRAINING PROGRAM

## 2025-05-24 RX ORDER — AMOXICILLIN 250 MG
2 CAPSULE ORAL 2 TIMES DAILY PRN
Status: DISCONTINUED | OUTPATIENT
Start: 2025-05-24 | End: 2025-05-26 | Stop reason: HOSPADM

## 2025-05-24 RX ORDER — BISACODYL 10 MG
10 SUPPOSITORY, RECTAL RECTAL DAILY PRN
Status: DISCONTINUED | OUTPATIENT
Start: 2025-05-24 | End: 2025-05-26 | Stop reason: HOSPADM

## 2025-05-24 RX ORDER — LIDOCAINE 4 G/G
1 PATCH TOPICAL
Status: DISCONTINUED | OUTPATIENT
Start: 2025-05-24 | End: 2025-05-26 | Stop reason: HOSPADM

## 2025-05-24 RX ORDER — POTASSIUM CHLORIDE 1.5 G/1.58G
40 POWDER, FOR SOLUTION ORAL ONCE
Status: COMPLETED | OUTPATIENT
Start: 2025-05-24 | End: 2025-05-24

## 2025-05-24 RX ORDER — POTASSIUM CHLORIDE 7.45 MG/ML
10 INJECTION INTRAVENOUS ONCE
Status: COMPLETED | OUTPATIENT
Start: 2025-05-24 | End: 2025-05-24

## 2025-05-24 RX ORDER — MAGNESIUM SULFATE HEPTAHYDRATE 40 MG/ML
2 INJECTION, SOLUTION INTRAVENOUS ONCE
Status: COMPLETED | OUTPATIENT
Start: 2025-05-24 | End: 2025-05-24

## 2025-05-24 RX ORDER — ACETAMINOPHEN 650 MG/1
650 SUPPOSITORY RECTAL EVERY 4 HOURS PRN
Status: DISCONTINUED | OUTPATIENT
Start: 2025-05-24 | End: 2025-05-25

## 2025-05-24 RX ORDER — ONDANSETRON 2 MG/ML
4 INJECTION INTRAMUSCULAR; INTRAVENOUS EVERY 6 HOURS PRN
Status: DISCONTINUED | OUTPATIENT
Start: 2025-05-24 | End: 2025-05-26 | Stop reason: HOSPADM

## 2025-05-24 RX ORDER — SODIUM CHLORIDE 9 MG/ML
40 INJECTION, SOLUTION INTRAVENOUS AS NEEDED
Status: DISCONTINUED | OUTPATIENT
Start: 2025-05-24 | End: 2025-05-25

## 2025-05-24 RX ORDER — FAMOTIDINE 20 MG/1
40 TABLET, FILM COATED ORAL DAILY
Status: DISCONTINUED | OUTPATIENT
Start: 2025-05-25 | End: 2025-05-26 | Stop reason: HOSPADM

## 2025-05-24 RX ORDER — OXYCODONE AND ACETAMINOPHEN 5; 325 MG/1; MG/1
1 TABLET ORAL ONCE
Refills: 0 | Status: COMPLETED | OUTPATIENT
Start: 2025-05-25 | End: 2025-05-24

## 2025-05-24 RX ORDER — SODIUM CHLORIDE 0.9 % (FLUSH) 0.9 %
10 SYRINGE (ML) INJECTION EVERY 12 HOURS SCHEDULED
Status: DISCONTINUED | OUTPATIENT
Start: 2025-05-24 | End: 2025-05-25

## 2025-05-24 RX ORDER — POTASSIUM CHLORIDE 1500 MG/1
40 TABLET, EXTENDED RELEASE ORAL ONCE
Status: COMPLETED | OUTPATIENT
Start: 2025-05-24 | End: 2025-05-24

## 2025-05-24 RX ORDER — BISACODYL 5 MG/1
5 TABLET, DELAYED RELEASE ORAL DAILY PRN
Status: DISCONTINUED | OUTPATIENT
Start: 2025-05-24 | End: 2025-05-26 | Stop reason: HOSPADM

## 2025-05-24 RX ORDER — HYDROMORPHONE HYDROCHLORIDE 1 MG/ML
0.5 INJECTION, SOLUTION INTRAMUSCULAR; INTRAVENOUS; SUBCUTANEOUS ONCE
Refills: 0 | Status: COMPLETED | OUTPATIENT
Start: 2025-05-24 | End: 2025-05-24

## 2025-05-24 RX ORDER — NITROGLYCERIN 0.4 MG/1
0.4 TABLET SUBLINGUAL
Status: DISCONTINUED | OUTPATIENT
Start: 2025-05-24 | End: 2025-05-26 | Stop reason: HOSPADM

## 2025-05-24 RX ORDER — SODIUM CHLORIDE 0.9 % (FLUSH) 0.9 %
10 SYRINGE (ML) INJECTION AS NEEDED
Status: DISCONTINUED | OUTPATIENT
Start: 2025-05-24 | End: 2025-05-25

## 2025-05-24 RX ORDER — SODIUM CHLORIDE 9 MG/ML
100 INJECTION, SOLUTION INTRAVENOUS CONTINUOUS
Status: ACTIVE | OUTPATIENT
Start: 2025-05-24 | End: 2025-05-25

## 2025-05-24 RX ORDER — ONDANSETRON 4 MG/1
4 TABLET, ORALLY DISINTEGRATING ORAL EVERY 6 HOURS PRN
Status: DISCONTINUED | OUTPATIENT
Start: 2025-05-24 | End: 2025-05-26 | Stop reason: HOSPADM

## 2025-05-24 RX ORDER — POLYETHYLENE GLYCOL 3350 17 G/17G
17 POWDER, FOR SOLUTION ORAL DAILY PRN
Status: DISCONTINUED | OUTPATIENT
Start: 2025-05-24 | End: 2025-05-26 | Stop reason: HOSPADM

## 2025-05-24 RX ORDER — ACETAMINOPHEN 160 MG/5ML
650 SOLUTION ORAL EVERY 4 HOURS PRN
Status: DISCONTINUED | OUTPATIENT
Start: 2025-05-24 | End: 2025-05-25

## 2025-05-24 RX ORDER — ACETAMINOPHEN 325 MG/1
650 TABLET ORAL EVERY 4 HOURS PRN
Status: DISCONTINUED | OUTPATIENT
Start: 2025-05-24 | End: 2025-05-26 | Stop reason: HOSPADM

## 2025-05-24 RX ADMIN — SODIUM CHLORIDE 1000 ML: 9 INJECTION, SOLUTION INTRAVENOUS at 14:41

## 2025-05-24 RX ADMIN — Medication 5 MG: at 23:32

## 2025-05-24 RX ADMIN — POTASSIUM CHLORIDE 10 MEQ: 7.46 INJECTION, SOLUTION INTRAVENOUS at 15:02

## 2025-05-24 RX ADMIN — MAGNESIUM SULFATE HEPTAHYDRATE 2 G: 40 INJECTION, SOLUTION INTRAVENOUS at 14:32

## 2025-05-24 RX ADMIN — THIAMINE HYDROCHLORIDE 500 MG: 100 INJECTION, SOLUTION INTRAMUSCULAR; INTRAVENOUS at 22:04

## 2025-05-24 RX ADMIN — LIDOCAINE 1 PATCH: 4 PATCH TOPICAL at 22:05

## 2025-05-24 RX ADMIN — POTASSIUM CHLORIDE 40 MEQ: 1.5 POWDER, FOR SOLUTION ORAL at 22:04

## 2025-05-24 RX ADMIN — SODIUM CHLORIDE 100 ML/HR: 9 INJECTION, SOLUTION INTRAVENOUS at 22:40

## 2025-05-24 RX ADMIN — POTASSIUM CHLORIDE 40 MEQ: 1500 TABLET, EXTENDED RELEASE ORAL at 14:43

## 2025-05-24 RX ADMIN — SODIUM CHLORIDE 500 ML: 9 INJECTION, SOLUTION INTRAVENOUS at 12:43

## 2025-05-24 RX ADMIN — HYDROMORPHONE HYDROCHLORIDE 0.5 MG: 1 INJECTION, SOLUTION INTRAMUSCULAR; INTRAVENOUS; SUBCUTANEOUS at 14:31

## 2025-05-24 RX ADMIN — OXYCODONE AND ACETAMINOPHEN 1 TABLET: 5; 325 TABLET ORAL at 23:32

## 2025-05-24 RX ADMIN — ACETAMINOPHEN 650 MG: 325 TABLET, FILM COATED ORAL at 22:03

## 2025-05-24 RX ADMIN — SODIUM PHOSPHATE, MONOBASIC, MONOHYDRATE AND SODIUM PHOSPHATE, DIBASIC, ANHYDROUS 15 MMOL: 142; 276 INJECTION, SOLUTION INTRAVENOUS at 23:33

## 2025-05-24 RX ADMIN — Medication 10 ML: at 22:04

## 2025-05-24 NOTE — [facility] PROVIDER NOTE
Subjective   History of Present Illness  Patient is a 40-year-old female presenting with epigastric left upper quadrant pain.  Patient states she has had the symptoms for almost a week.  She followed up with her primary care doctor and was treated for UTI though her UA was negative for infection.  She states the pain is sharp and wakes her up at night.  She does drink significantly up to 24 drinks a week though she has not drank the last week due to the pain.  She has had some nausea and constipation no fever or vomiting.  No bowel movement since last week Friday and that was with the use of laxatives.      Review of Systems   Constitutional:  Negative for chills and fever.   HENT:  Negative for congestion, rhinorrhea and sore throat.    Eyes:  Negative for pain and visual disturbance.   Respiratory:  Negative for apnea, cough, chest tightness and shortness of breath.    Cardiovascular:  Negative for chest pain and palpitations.   Gastrointestinal:  Positive for abdominal pain and nausea. Negative for diarrhea and vomiting.   Genitourinary:  Negative for difficulty urinating and dysuria.   Musculoskeletal:  Negative for joint swelling and myalgias.   Skin:  Negative for color change.   Neurological:  Negative for seizures and headaches.   Psychiatric/Behavioral: Negative.     All other systems reviewed and are negative.      Past Medical History:   Diagnosis Date    ACL tear     ADHD     Disease of thyroid gland     PCL deficiency, knee, left     Torn meniscus        Allergies   Allergen Reactions    Hydrocodone Itching    Insulin Detemir Other (See Comments)     Redness and itching at injection site       Past Surgical History:   Procedure Laterality Date    BREAST AUGMENTATION  2004     SECTION      KNEE SURGERY         Family History   Problem Relation Age of Onset    Ovarian cancer Maternal Aunt     Breast cancer Maternal Grandmother     No Known Problems Mother     No Known Problems Father         Social History     Socioeconomic History    Marital status:     Number of children: 1   Tobacco Use    Smoking status: Never     Passive exposure: Never    Smokeless tobacco: Never   Substance and Sexual Activity    Alcohol use: Yes     Comment: occ    Drug use: No    Sexual activity: Yes     Partners: Male           Objective   Physical Exam  Vitals and nursing note reviewed.   Constitutional:       General: She is not in acute distress.     Appearance: Normal appearance. She is not toxic-appearing.   HENT:      Head: Normocephalic and atraumatic.      Jaw: There is normal jaw occlusion.   Eyes:      General: Lids are normal.      Extraocular Movements: Extraocular movements intact.      Conjunctiva/sclera: Conjunctivae normal.      Pupils: Pupils are equal, round, and reactive to light.   Cardiovascular:      Rate and Rhythm: Normal rate and regular rhythm.      Pulses: Normal pulses.      Heart sounds: Normal heart sounds.   Pulmonary:      Effort: Pulmonary effort is normal. No respiratory distress.      Breath sounds: Normal breath sounds. No wheezing or rhonchi.   Abdominal:      General: Abdomen is flat.      Palpations: Abdomen is soft.      Tenderness: There is abdominal tenderness. There is no guarding or rebound.      Comments: Left upper quadrant epigastric tenderness   Musculoskeletal:         General: Normal range of motion.      Cervical back: Normal range of motion and neck supple.      Right lower leg: No edema.      Left lower leg: No edema.   Skin:     General: Skin is warm and dry.   Neurological:      Mental Status: She is alert and oriented to person, place, and time. Mental status is at baseline.   Psychiatric:         Mood and Affect: Mood normal.         Procedures           ED Course  ED Course as of 05/24/25 1502   Sat May 24, 2025   1407 Patient's creatinine is 1.71 previous was greater than 3.  Hemoglobin is 10.9 which is her baseline.  hCG negative lipase 41 magnesium 1.2  with a potassium of 2.8.  CT is concerning for cirrhosis and acute on chronic pancreatitis.  Patient has significant left upper quadrant pain and CT findings consistent with acute pancreatitis.  Even though her lipase is 41 this still meets criteria for acute pancreatitis.  Patient given fluids and pain medications. [LQ]   1450 I spoke with the hospitalist Dr. Davis who is agreeable to admit the patient to telemetry for acute on chronic pancreatitis with intractable pain. [LQ]      ED Course User Index  [LQ] Ange Bennett MD                                           Medical Decision Making  Problems Addressed:  Acute pancreatitis, unspecified complication status, unspecified pancreatitis type: complicated acute illness or injury    Amount and/or Complexity of Data Reviewed  Labs: ordered.  Radiology: ordered.    Risk  Prescription drug management.  Decision regarding hospitalization.        Final diagnoses:   Acute pancreatitis, unspecified complication status, unspecified pancreatitis type       ED Disposition  ED Disposition       ED Disposition   Decision to Admit    Condition   --    Comment   Level of Care: Telemetry [5]   Diagnosis: Acute pancreatitis [577.0.ICD-9-CM]   Admitting Physician: ABBIE DAVIS [[id number]]   Certification: I Certify That Inpatient Hospital Services Are Medically Necessary For Greater Than 2 Midnights                 No follow-up provider specified.       Medication List      No changes were made to your prescriptions during this visit.

## 2025-05-24 NOTE — PLAN OF CARE
Goal Outcome Evaluation:  Plan of Care Reviewed With: patient        Progress: no change  Outcome Evaluation: Pt arrived from [facility] ED with acute pancreatitis. VSS. Pt regularly drinks alcohol; expecting CIWA orders. Alert and oriented x4. Awaiting for MD orders. Remains NPO. UAL.Mag and potassium not within normal limits. Discussed findings with night shift RN.

## 2025-05-24 NOTE — H&P
HISTORY AND PHYSICAL   [facility]        Patient Identification:  Name: Stephanie Appiah  Age: 40 y.o.  Sex: female  :  1985  MRN: [id number]                     Primary Care Physician: Provider, No Known    Chief Complaint: Left lower rib pain.    History of Present Illness:   Pleasant 40-year-old female states she woke up Monday morning with sharp pain and she points to the left lower rib area about mid clavicular line on the left and extending back to the posterior axillary line.  She states it is sharp.  Initially she noticed that nothing seemed to make it better or worse but then later noted that she was more comfortable lying on the left side.  Otherwise it is not affected by position or movement.  Not affected by oral intake or bowel movements.  She noted that on Tuesday she she had a few dry heaves.  Bowel movements were normal until she decided to take a laxative in case this was constipation.  That resulted in a loose stool.  Last bowel movement 2 days ago.  No fever sweats or chills.  She was seen at an urgent care center 2 days ago and was given an antibiotic and is unclear as to why the antibiotic was prescribed.  She states she knows that she drinks too much.  She has about 24 drinks a week.  She states she has never been through alcohol withdrawal.  She stopped drinking when she noted the pain earlier this week.  On questioning she states she is also never had pancreatitis.  On questioning still noting the left rib pain.      Past Medical History:  Past Medical History:   Diagnosis Date    ACL tear     ADHD     Disease of thyroid gland     PCL deficiency, knee, left     Torn meniscus      Past Surgical History:  Past Surgical History:   Procedure Laterality Date    BREAST AUGMENTATION  2004     SECTION      KNEE SURGERY        Home Meds:  No medications prior to admission.       Allergies:  Allergies   Allergen Reactions    Hydrocodone Itching    Insulin Detemir  Other (See Comments)     Redness and itching at injection site     Immunizations:  Immunization History   Administered Date(s) Administered    Flu Vaccine Split Quad 2016    Fluzone (or Fluarix & Flulaval for VFC) >6mos 2016    Hep B, Adolescent or Pediatric 2002, 2002, 2003    Tdap 2017     Social History:   Social History     Social History Narrative    Not on file     Social History     Tobacco Use    Smoking status: Never     Passive exposure: Never    Smokeless tobacco: Never   Substance Use Topics    Alcohol use: Yes     Alcohol/week: 8.0 standard drinks of alcohol     Types: 8 Glasses of wine per week     Family History:  Family History   Problem Relation Age of Onset    Ovarian cancer Maternal Aunt     Breast cancer Maternal Grandmother     No Known Problems Mother     No Known Problems Father         Review of Systems  Review of Systems   Constitutional: Negative.    HENT: Negative.     Eyes: Negative.    Respiratory: Negative.     Cardiovascular: Negative.    Gastrointestinal:         As per history of present illness.   Endocrine: Negative.    Genitourinary: Negative.    Musculoskeletal:         As per history of present illness.   Skin: Negative.    Allergic/Immunologic: Negative.    Neurological: Negative.    Hematological: Negative.    Psychiatric/Behavioral: Negative.         Objective:  T Max 24 hrs: Temp (24hrs), Av.9 °F (37.2 °C), Min:98.9 °F (37.2 °C), Max:98.9 °F (37.2 °C)    Vitals Ranges:   Temp:  [98.9 °F (37.2 °C)] 98.9 °F (37.2 °C)  Heart Rate:  [84-87] 84  Resp:  [16-18] 18  BP: (131-149)/(93-96) 131/93      Exam:  Physical Exam  Constitutional:       General: She is not in acute distress.     Appearance: Normal appearance. She is normal weight. She is not ill-appearing, toxic-appearing or diaphoretic.   HENT:      Head: Normocephalic and atraumatic.      Right Ear: External ear normal.      Left Ear: External ear normal.      Nose: Nose normal.       Mouth/Throat:      Mouth: Mucous membranes are moist.   Eyes:      General: No scleral icterus.        Right eye: No discharge.         Left eye: No discharge.      Extraocular Movements: Extraocular movements intact.      Conjunctiva/sclera: Conjunctivae normal.   Cardiovascular:      Rate and Rhythm: Normal rate and regular rhythm.      Heart sounds:      No friction rub. No gallop.   Pulmonary:      Effort: Pulmonary effort is normal.      Breath sounds: Normal breath sounds.      Comments: There is tenderness along about the bottom 3 of the lower ribs on the left extending from the mid scapular line posteriorly around to the mid clavicular line anteriorly.  No palpable abnormalities.  No abdominal tenderness.  No CVA tenderness.  Chest:      Chest wall: Tenderness present.   Abdominal:      General: Abdomen is flat. Bowel sounds are normal. There is no distension.      Palpations: Abdomen is soft. There is no mass.      Tenderness: There is no abdominal tenderness. There is no guarding or rebound.      Comments: See pulmonary exam.   Musculoskeletal:      Cervical back: Neck supple.      Right lower leg: No edema.      Left lower leg: No edema.   Skin:     General: Skin is warm and dry.      Comments: No rash in the area of the left lower rib/chest pain area.  It is not tender to light touch.   Neurological:      General: No focal deficit present.      Mental Status: She is alert and oriented to person, place, and time.   Psychiatric:         Mood and Affect: Mood normal.         Behavior: Behavior normal.         Thought Content: Thought content normal.         Judgment: Judgment normal.         Data Review:  All labs and radiology reviewed.    Assessment:  Left rib pain: Unknown etiology.  It is not point specific.  And personally viewing the CT scan I do not see an explanation for this.  Does appear to have a radicular pattern.  Consider the possibility of emerging shingles.  Will check HSV-1 antibodies.   Consider the possibility of radicular T-spine pain but I would expect more of a positional component.  For the meantime treat symptomatically with Lidoderm patch and nonnarcotic oral pain medications  Hypokalemia: Replace and monitor.  LIBIA versus worsening CKD 3B.  Creatinine 1.71.  Baseline 1.33.  This been 5 years since her last creatinine level however.  I suspect worsening of CKD.  Gentle hydration overnight.  She will need nephrology follow-up.  If creatinine stable this could be done here or as an outpatient.  Patient states kidney issues were due to pregnancy complications.  Abnormal pancreas on CT scan: Changes consistent with chronic pancreatitis.  No clinical indication of acute pancreatitis.  Alcohol abuse: Access consult.  Reportedly last drink was at least 5 days ago.  Initiate thiamine.  No evidence of withdrawal.  Monitor.  Hypomagnesemia: Likely secondary to above.  Replace and monitor.  Check phosphate levels.  Cirrhosis: Noted on CT scan.  Very possibly secondary to EtOH.  Check hepatitis profile.  She will need GI follow-up.  This could be started here or as an outpatient.  Cholelithiasis: No acute cholecystitis.  Monitor.  Macrocytic anemia: Likely secondary to EtOH.  Check B12, folate.  Hyperglycemia: Check A1c.  Monitor.      Plan:  Please see above.  Discussed with patient.  Discussed with RN.  Discussed with referring provider.    Luis Davis MD  5/24/2025  19:39 EDT    EMR Dragon/Transcription disclaimer:   Much of this encounter note is an electronic transcription/translation of spoken language to printed text. The electronic translation of spoken language may permit erroneous, or at times, nonsensical words or phrases to be inadvertently transcribed; Although I have reviewed the note for such errors, some may still exist.     Addendum:  Discussed with the patient the need for nephrology evaluation follow-up as well as gastroenterology.  She used to see nephrology at [facility] but has  been at least a few years due to her lack of follow-up.  She has not seen a gastroenterologist before.  She is very amenable to seeing a gastroenterologist and nephrology here and I will go ahead and place that consult.  She is also very much aware of her need to stop EtOH consumption and is amenable to counseling in this respect.  Electronically signed by Luis Davis MD, 05/24/25, 8:33 PM EDT.

## 2025-05-24 NOTE — NURSING NOTE
Pt arrived from [facility] at this time.       Called Dr. Davis for admission orders, spoke to Debbie.

## 2025-05-25 ENCOUNTER — APPOINTMENT (OUTPATIENT)
Dept: CT IMAGING | Facility: HOSPITAL | Age: 40
End: 2025-05-25
Payer: COMMERCIAL

## 2025-05-25 ENCOUNTER — INPATIENT HOSPITAL (OUTPATIENT)
Dept: URBAN - METROPOLITAN AREA HOSPITAL 113 | Facility: HOSPITAL | Age: 40
End: 2025-05-25
Payer: COMMERCIAL

## 2025-05-25 ENCOUNTER — APPOINTMENT (OUTPATIENT)
Dept: ULTRASOUND IMAGING | Facility: HOSPITAL | Age: 40
End: 2025-05-25
Payer: COMMERCIAL

## 2025-05-25 DIAGNOSIS — R10.11 RIGHT UPPER QUADRANT PAIN: ICD-10-CM

## 2025-05-25 PROBLEM — F10.10 ALCOHOL ABUSE: Status: ACTIVE | Noted: 2025-05-25

## 2025-05-25 PROBLEM — N18.31 STAGE 3A CHRONIC KIDNEY DISEASE: Status: ACTIVE | Noted: 2025-05-25

## 2025-05-25 PROBLEM — D53.9 MACROCYTIC ANEMIA: Status: ACTIVE | Noted: 2025-05-25

## 2025-05-25 PROBLEM — R10.12 LUQ ABDOMINAL PAIN: Status: ACTIVE | Noted: 2025-05-24

## 2025-05-25 LAB
ALBUMIN SERPL-MCNC: 3.3 G/DL (ref 3.5–5.2)
ALBUMIN/GLOB SERPL: 0.9 G/DL
ALP SERPL-CCNC: 157 U/L (ref 39–117)
ALT SERPL W P-5'-P-CCNC: 17 U/L (ref 1–33)
ANION GAP SERPL CALCULATED.3IONS-SCNC: 14 MMOL/L (ref 5–15)
AST SERPL-CCNC: 24 U/L (ref 1–32)
BASOPHILS # BLD MANUAL: 0 10*3/MM3 (ref 0–0.2)
BASOPHILS NFR BLD MANUAL: 0 % (ref 0–1.5)
BILIRUB SERPL-MCNC: 0.4 MG/DL (ref 0–1.2)
BUN SERPL-MCNC: 14 MG/DL (ref 6–20)
BUN/CREAT SERPL: 8.8 (ref 7–25)
CALCIUM SPEC-SCNC: 8.1 MG/DL (ref 8.6–10.5)
CHLORIDE SERPL-SCNC: 101 MMOL/L (ref 98–107)
CO2 SERPL-SCNC: 21 MMOL/L (ref 22–29)
CREAT SERPL-MCNC: 1.6 MG/DL (ref 0.57–1)
CREAT UR-MCNC: 25.1 MG/DL
DEPRECATED RDW RBC AUTO: 50.8 FL (ref 37–54)
EGFRCR SERPLBLD CKD-EPI 2021: 41.4 ML/MIN/1.73
EOSINOPHIL # BLD MANUAL: 0.11 10*3/MM3 (ref 0–0.4)
EOSINOPHIL NFR BLD MANUAL: 2.1 % (ref 0.3–6.2)
ERYTHROCYTE [DISTWIDTH] IN BLOOD BY AUTOMATED COUNT: 13.9 % (ref 12.3–15.4)
FOLATE SERPL-MCNC: 8.16 NG/ML (ref 4.78–24.2)
GGT SERPL-CCNC: 334 U/L (ref 5–36)
GLOBULIN UR ELPH-MCNC: 3.6 GM/DL
GLUCOSE SERPL-MCNC: 166 MG/DL (ref 65–99)
HBA1C MFR BLD: 5.9 % (ref 4.8–5.6)
HBV CORE IGM SERPL QL IA: NORMAL
HBV SURFACE AG SERPL QL IA: NORMAL
HCT VFR BLD AUTO: 30.5 % (ref 34–46.6)
HCV AB SER QL: NORMAL
HGB BLD-MCNC: 10.1 G/DL (ref 12–15.9)
INR PPP: 1.14 (ref 0.9–1.1)
LYMPHOCYTES # BLD MANUAL: 1.07 10*3/MM3 (ref 0.7–3.1)
LYMPHOCYTES NFR BLD MANUAL: 7.3 % (ref 5–12)
MAGNESIUM SERPL-MCNC: 1.7 MG/DL (ref 1.6–2.6)
MCH RBC QN AUTO: 33.4 PG (ref 26.6–33)
MCHC RBC AUTO-ENTMCNC: 33.1 G/DL (ref 31.5–35.7)
MCV RBC AUTO: 101 FL (ref 79–97)
MONOCYTES # BLD: 0.39 10*3/MM3 (ref 0.1–0.9)
NEUTROPHILS # BLD AUTO: 3.82 10*3/MM3 (ref 1.7–7)
NEUTROPHILS NFR BLD MANUAL: 70.8 % (ref 42.7–76)
NRBC BLD AUTO-RTO: 0 /100 WBC (ref 0–0.2)
PHOSPHATE SERPL-MCNC: 5.4 MG/DL (ref 2.5–4.5)
PLAT MORPH BLD: NORMAL
PLATELET # BLD AUTO: 142 10*3/MM3 (ref 140–450)
PMV BLD AUTO: 11.3 FL (ref 6–12)
POTASSIUM SERPL-SCNC: 3.8 MMOL/L (ref 3.5–5.2)
PROT ?TM UR-MCNC: <4 MG/DL
PROT SERPL-MCNC: 6.9 G/DL (ref 6–8.5)
PROT/CREAT UR: NORMAL MG/G{CREAT}
PROTHROMBIN TIME: 14.5 SECONDS (ref 11.7–14.2)
RBC # BLD AUTO: 3.02 10*6/MM3 (ref 3.77–5.28)
RBC MORPH BLD: NORMAL
SODIUM SERPL-SCNC: 136 MMOL/L (ref 136–145)
SODIUM UR-SCNC: 86 MMOL/L
VARIANT LYMPHS NFR BLD MANUAL: 19.8 % (ref 19.6–45.3)
VIT B12 BLD-MCNC: 531 PG/ML (ref 211–946)
WBC MORPH BLD: NORMAL
WBC NRBC COR # BLD AUTO: 5.4 10*3/MM3 (ref 3.4–10.8)

## 2025-05-25 PROCEDURE — 86696 HERPES SIMPLEX TYPE 2 TEST: CPT | Performed by: HOSPITALIST

## 2025-05-25 PROCEDURE — 82607 VITAMIN B-12: CPT | Performed by: HOSPITALIST

## 2025-05-25 PROCEDURE — 86704 HEP B CORE ANTIBODY TOTAL: CPT | Performed by: HOSPITALIST

## 2025-05-25 PROCEDURE — 85610 PROTHROMBIN TIME: CPT | Performed by: HOSPITALIST

## 2025-05-25 PROCEDURE — 82570 ASSAY OF URINE CREATININE: CPT | Performed by: INTERNAL MEDICINE

## 2025-05-25 PROCEDURE — 85007 BL SMEAR W/DIFF WBC COUNT: CPT | Performed by: HOSPITALIST

## 2025-05-25 PROCEDURE — 83735 ASSAY OF MAGNESIUM: CPT | Performed by: HOSPITALIST

## 2025-05-25 PROCEDURE — 80074 ACUTE HEPATITIS PANEL: CPT | Performed by: HOSPITALIST

## 2025-05-25 PROCEDURE — 84300 ASSAY OF URINE SODIUM: CPT | Performed by: INTERNAL MEDICINE

## 2025-05-25 PROCEDURE — 82746 ASSAY OF FOLIC ACID SERUM: CPT | Performed by: HOSPITALIST

## 2025-05-25 PROCEDURE — 84156 ASSAY OF PROTEIN URINE: CPT | Performed by: INTERNAL MEDICINE

## 2025-05-25 PROCEDURE — 25810000003 SODIUM CHLORIDE 0.9 % SOLUTION: Performed by: HOSPITALIST

## 2025-05-25 PROCEDURE — 84100 ASSAY OF PHOSPHORUS: CPT | Performed by: HOSPITALIST

## 2025-05-25 PROCEDURE — 86695 HERPES SIMPLEX TYPE 1 TEST: CPT | Performed by: HOSPITALIST

## 2025-05-25 PROCEDURE — 83036 HEMOGLOBIN GLYCOSYLATED A1C: CPT | Performed by: HOSPITALIST

## 2025-05-25 PROCEDURE — 90791 PSYCH DIAGNOSTIC EVALUATION: CPT

## 2025-05-25 PROCEDURE — 25510000001 IOPAMIDOL 61 % SOLUTION: Performed by: HOSPITALIST

## 2025-05-25 PROCEDURE — 99221 1ST HOSP IP/OBS SF/LOW 40: CPT

## 2025-05-25 PROCEDURE — 80053 COMPREHEN METABOLIC PANEL: CPT | Performed by: HOSPITALIST

## 2025-05-25 PROCEDURE — 76775 US EXAM ABDO BACK WALL LIM: CPT

## 2025-05-25 PROCEDURE — 74177 CT ABD & PELVIS W/CONTRAST: CPT

## 2025-05-25 PROCEDURE — 85025 COMPLETE CBC W/AUTO DIFF WBC: CPT | Performed by: HOSPITALIST

## 2025-05-25 PROCEDURE — 87517 HEPATITIS B DNA QUANT: CPT | Performed by: HOSPITALIST

## 2025-05-25 PROCEDURE — 82977 ASSAY OF GGT: CPT

## 2025-05-25 RX ORDER — OXYCODONE AND ACETAMINOPHEN 5; 325 MG/1; MG/1
1 TABLET ORAL EVERY 4 HOURS PRN
Refills: 0 | Status: DISCONTINUED | OUTPATIENT
Start: 2025-05-25 | End: 2025-05-26 | Stop reason: HOSPADM

## 2025-05-25 RX ORDER — IOPAMIDOL 612 MG/ML
85 INJECTION, SOLUTION INTRAVASCULAR
Status: COMPLETED | OUTPATIENT
Start: 2025-05-25 | End: 2025-05-25

## 2025-05-25 RX ADMIN — OXYCODONE AND ACETAMINOPHEN 1 TABLET: 5; 325 TABLET ORAL at 18:19

## 2025-05-25 RX ADMIN — SODIUM PHOSPHATE, MONOBASIC, MONOHYDRATE AND SODIUM PHOSPHATE, DIBASIC, ANHYDROUS 15 MMOL: 142; 276 INJECTION, SOLUTION INTRAVENOUS at 02:56

## 2025-05-25 RX ADMIN — IOPAMIDOL 85 ML: 612 INJECTION, SOLUTION INTRAVENOUS at 16:56

## 2025-05-25 RX ADMIN — FAMOTIDINE 40 MG: 20 TABLET, FILM COATED ORAL at 08:55

## 2025-05-25 RX ADMIN — Medication 10 ML: at 08:55

## 2025-05-25 RX ADMIN — LIDOCAINE 1 PATCH: 4 PATCH TOPICAL at 21:17

## 2025-05-25 RX ADMIN — ACETAMINOPHEN 650 MG: 325 TABLET, FILM COATED ORAL at 21:18

## 2025-05-25 RX ADMIN — OXYCODONE AND ACETAMINOPHEN 1 TABLET: 5; 325 TABLET ORAL at 22:21

## 2025-05-25 RX ADMIN — OXYCODONE AND ACETAMINOPHEN 1 TABLET: 5; 325 TABLET ORAL at 11:50

## 2025-05-25 RX ADMIN — Medication 5 MG: at 21:18

## 2025-05-25 RX ADMIN — Medication 100 MG: at 08:55

## 2025-05-25 NOTE — CONSULTS
Patient seen by [facility] d/t ETOH. Patient interviewed alone in room 406 (4S). Introduced self and role. Patient agreed to participate in evaluation. Patient is A/OX4. Patient pleasant and calm during evaluation.     The patient is a 40 y.o.  female living with her 2 children.   Bahai/Spiritual: Episcopal. The patient denied any Yazdanism or spiritual concerns.   Children: 2 children, ages 7 and 14.   Occupation:   Hobbies: The patient voiced she has had a gym membership since January and plans on going to the gym now.   Legal: Denied any current issues. Reported a DUI at age 20.   : No  Support System: Family and Friends. The patient voiced she has a good support group.   Hx of Violence: Denies  Hx of Abuse/Trauma: Denies  Feel Safe at Home: Yes    The patient presented to the ED with abdominal pain. Patient transferred to [facility] on 5/24/25 with pancreatitis. The patient voiced she has never experienced pancreatitis.     The patient voiced she drinks ETOH daily while she is cooking. The patient voiced if she is not cooking she does not drink ETOH. The patient voiced she typically has 2 glasses of wine while cooking but if she is cooking for longer may have 3 or 4 glasses of wine. The patient denied ever having 6 or more drinks in a day. The patient denied any blackouts/memory loss from ETOH. The patient voiced she began drinking at age 17 while in college but has been drinking this amount since 2007. The patient voiced her last drink was on 5/16/25 and denied experiencing any ETOH withdrawal then or in the past. BAL negative upon ED arrival. The patient voiced her longest period of sobriety was when she was pregnant/nursing. The patient denied any history of QUIANA treatment. The patient denied any other substance use. No UDS collected.     The patient reported a mental health history of ADHD and PPD. The patient was previously on Adderall but is not currently on any  mental health medications. The patient voiced while she was taking Adderall she did not drink ETOH. The patient voiced she previously was on antidepressants r/t PPD. The patient reported a history of therapy but does not currently have any outpatient mental health providers.     The patient denied anxiety, depression, SI, wish to be dead, HI, or hallucinations. The patient voiced good sleep and appetite.     The patient voiced she plans on abstaining from ETOH. The patient voiced she has a strong will and will not keep ETOH in the house but is not interested in QUIANA treatment at this time. Patient encouraged to remain open to treatment if she finds it harder to abstain from ETOH than she thought. Patient provided with information for [facility] QUIANA IOP and the [facility] handout. [facility] will follow.

## 2025-05-25 NOTE — CONSULTS
[facility] Consult Note      Patient Name: Stephanie Appiah  : 1985  MRN: [id number]  Primary Care Physician:  Provider, No Known  Referring Physician: No Known Provider  Date of admission: 2025    Subjective     Reason for Consult: Management of chronic kidney disease stage IIIb    HPI:   Stephanie Appiah is a 40 y.o. female who got admitted to the hospital on 2025.    The patient has history of ADHD.  She had 2 pregnancies in the past and developed HELLP syndrome.  Both times she had to deliver her babies early.  She developed worsening of the kidney function both times.    She presented to the hospital complaining of pain involving the left lower rib area and extending to the back.  The pain was sharp and did not have any aggravating factors.  The pain was getting better whenever she lies on her left side.  The pain started about 4 days before admission.    The patient has been having serum creatinine around 1.3 to 1.6 mg/dL at least since 2019.    She had CT scan of the abdomen which was suggestive of chronic pancreatitis.  The patient has history of alcohol abuse.  There is a possibility of cirrhosis based on CT scan of the abdomen.    The patient does not have fever.  The lower rib pain is better.  She denies having any urinary symptoms.    Review of Systems:   14 point review of systems is otherwise negative except for mentioned above on HPI    Personal History     Past Medical History:   Diagnosis Date    ACL tear     ADHD     Disease of thyroid gland     PCL deficiency, knee, left     Torn meniscus        Past Surgical History:   Procedure Laterality Date    BREAST AUGMENTATION  2004     SECTION      KNEE SURGERY         Family History: family history includes Breast cancer in her maternal grandmother; No Known Problems in her father and mother; Ovarian cancer in her maternal aunt.    Social History:  reports that she has never  smoked. She has never been exposed to tobacco smoke. She has never used smokeless tobacco. She reports current alcohol use of about 8.0 standard drinks of alcohol per week. She reports that she does not use drugs.    Home Medications:  Prior to Admission medications    Not on File       Allergies:  Allergies   Allergen Reactions    Hydrocodone Itching    Insulin Detemir Other (See Comments)     Redness and itching at injection site       Objective     Vitals:   Temp:  [97.7 °F (36.5 °C)-98.9 °F (37.2 °C)] 97.7 °F (36.5 °C)  Heart Rate:  [] 86  Resp:  [18] 18  BP: (114-137)/(76-95) 124/76    Intake/Output Summary (Last 24 hours) at 5/25/2025 1349  Last data filed at 5/25/2025 0646  Gross per 24 hour   Intake 1950 ml   Output --   Net 1950 ml       Physical Exam:   Constitutional: Awake, alert, no acute distress.  HEENT: Sclera anicteric, no conjunctival injection  Neck: Supple, no thyromegaly, no lymphadenopathy, trachea at midline, no JVD  Respiratory: Clear to auscultation bilaterally, nonlabored respiration  Cardiovascular: RRR, no murmurs, no rubs or gallops, no carotid bruit  Gastrointestinal: Positive bowel sounds, abdomen is soft, nontender and nondistended  : No palpable bladder  Musculoskeletal: No edema, no clubbing or cyanosis  Psychiatric: Appropriate affect, cooperative  Neurologic: Oriented x3, moving all extremities, normal speech and mental status  Skin: Warm and dry       Scheduled Meds:     famotidine, 40 mg, Oral, Daily  Lidocaine, 1 patch, Transdermal, Q24H  thiamine, 100 mg, Oral, Daily      IV Meds:   sodium chloride, 100 mL/hr, Last Rate: 100 mL/hr (05/24/25 2240)        Results Reviewed:   I have personally reviewed the results from the time of this admission to 5/25/2025 13:49 EDT     Lab Results   Component Value Date    GLUCOSE 166 (H) 05/25/2025    CALCIUM 8.1 (L) 05/25/2025     05/25/2025    K 3.8 05/25/2025    CO2 21.0 (L) 05/25/2025     05/25/2025    BUN 14  05/25/2025    CREATININE 1.60 (H) 05/25/2025    EGFRIFNONA 45 (L) 07/04/2020    BCR 8.8 05/25/2025    ANIONGAP 14.0 05/25/2025      Lab Results   Component Value Date    MG 1.7 05/25/2025    PHOS 5.4 (H) 05/25/2025    ALBUMIN 3.3 (L) 05/25/2025         I reviewed CT scan of the abdomen and pelvis results from 5/24/2025.  The patient has atrophic appearing left kidney and normal right kidney.  There was no evidence of hydronephrosis.    Assessment / Plan     ASSESSMENT:  1.  Chronic kidney disease stage IIIb, most likely this is related to incomplete recovery of previous acute kidney injury that happened when she developed HELLP syndrome during her previous 2 pregnancies  2.  Left rib pain  3.  Prediabetes  4.  Chronic pancreatitis based on CT scan results  5.  Cirrhosis, possibly related to alcohol abuse.  This is based on CT scan results  6.  Anemia    PLAN:  1.  I will check urine sodium and urine protein to creatinine ratio  2.  I will check kidney ultrasound  3.  IV fluids will be stopped  4.  The patient will need better control of her blood sugar since she has prediabetes.  I discussed with her losing weight and following a low-carb diet  5.  I will monitor hemoglobin.  I will check iron studies and ferritin    I discussed with hospital medicine.  I discussed with the patient and her .    Thank you for involving us in the care of Stephanie Appiah.  Please feel free to call with any questions.    Lu Guillory MD  05/25/25  13:49 EDT    [facility]  [phone]      Much of this encounter note is an electronic transcription/translation of spoken language to printed text. The electronic translation of spoken language may permit erroneous, or at times, nonsensical words or phrases to be inadvertently transcribed; Although I have reviewed the note for such errors, some may still exist.

## 2025-05-25 NOTE — PROGRESS NOTES
Name: Stephanie Appiah ADMIT: 2025   : 1985  PCP: Provider, No Known    MRN: [id number] LOS: 1 days   AGE/SEX: 40 y.o. female  ROOM: [facility]     Subjective   Subjective   Still having quite a bit of pain left upper quadrant now radiating more laterally and into her back.  No associated nausea or vomiting.  No recent injury.  No rash.       Objective   Objective   Vital Signs  Temp:  [98.1 °F (36.7 °C)-98.9 °F (37.2 °C)] 98.2 °F (36.8 °C)  Heart Rate:  [] 86  Resp:  [16-18] 18  BP: (114-149)/(85-96) 127/85  SpO2:  [98 %-100 %] 98 %  on   ;   Device (Oxygen Therapy): room air  Body mass index is 24.2 kg/m².  Physical Exam  Vitals and nursing note reviewed.   Constitutional:       General: She is not in acute distress.  Cardiovascular:      Rate and Rhythm: Normal rate and regular rhythm.   Pulmonary:      Effort: Pulmonary effort is normal.      Breath sounds: Normal breath sounds.   Abdominal:      General: Bowel sounds are normal. There is no distension.      Palpations: Abdomen is soft.      Tenderness: There is abdominal tenderness in the left upper quadrant. There is guarding. There is no rebound.   Musculoskeletal:         General: No swelling.   Skin:     General: Skin is warm and dry.   Neurological:      Mental Status: She is alert. Mental status is at baseline.       Results Review     I reviewed the patient's new clinical results.  Results from last 7 days   Lab Units 25  0455 25  1237   WBC 10*3/mm3 5.40 6.49   HEMOGLOBIN g/dL 10.1* 10.9*   PLATELETS 10*3/mm3 142 152     Results from last 7 days   Lab Units 25  0455 25  1933 25  1237   SODIUM mmol/L 136 137 137   POTASSIUM mmol/L 3.8 3.4* 2.8*   CHLORIDE mmol/L 101 103 102   CO2 mmol/L 21.0* 22.9 24.3   BUN mg/dL 14 15 18   CREATININE mg/dL 1.60* 1.56* 1.71*   GLUCOSE mg/dL 166* 103* 168*   EGFR mL/min/1.73 41.4* 42.7* 38.2*     Results from last 7 days   Lab Units 25  0455 25  5632  "[facility]  Progress Note  Name: Breana Guillaume  MRN: [id number]  Unit/Bed#: E4 -01 I Date of Admission: 6/10/2023   Date of Service: 6/11/2023 I Hospital Day: 1    Assessment/Plan   Ambulatory dysfunction  Assessment & Plan  · Pt notes ambulatory dysfunction  · Has recent PT with initial improvement, but has not continued with exercises  · Ambulates without assistive device  · CT brain revealed: \"Mild progressive ventriculomegaly without evidence of obstructive hydrocephalus  Findings may be secondary to progressive central volume loss  Findings are equivocal for normal pressure hydrocephalus\"  · PT eval for balance-->if abnormal, will refer to outpt [facility] clinic      Troponin I above reference range  Assessment & Plan  · Patient presented with a positive troponin of 66 to 76 to 69  · Initial EKG with T wave inversion in aVL: No previous EKG available for comparison  · He denies any cardiac symptoms  Continue to monitor  ECHO pending    Heart murmur  Assessment & Plan  Patient was recently diagnosed with heart murmur as an outpatient and echocardiogram ordered  follow-up echocardiogram as an inpatient, due to fall, and positive troponin  We will evaluate for aortic stenosis: However patient denies any syncope or symptoms concerning for symptomatic severe aortic stenosis  ECHO pending    Alcohol use  Assessment & Plan  · Patient drinks a very large glass of wine nightly  · We will start thiamine, folic acid, multivitamin  · We will monitor on CIWA scale  · Patient is also prescribed concurrent benzodiazepine with Xanax  Wife is concerned about patient drinking wine and taking xanax every night before bed   States he \"too stubborn\" to make lifestyle changes     Essential hypertension  Assessment & Plan  · Patient has essential hypertension  · Continue home medications with Norvasc 10 mg: Which he takes at at bedtime,, and lisinopril-HCTZ 20-25 mg he takes in the morning    Mild "   ALBUMIN g/dL 3.3* 3.7   BILIRUBIN mg/dL 0.4 0.4   ALK PHOS U/L 157* 184*   AST (SGOT) U/L 24 21   ALT (SGPT) U/L 17 22     Results from last 7 days   Lab Units 05/25/25  0455 05/24/25  1933 05/24/25  1237   CALCIUM mg/dL 8.1* 8.7 8.9   ALBUMIN g/dL 3.3*  --  3.7   MAGNESIUM mg/dL 1.7 1.9 1.2*   PHOSPHORUS mg/dL 5.4* 1.7*  --        Results from last 7 days   Lab Units 05/24/25  1237   LIPASE U/L 41     Hemoglobin A1C   Date/Time Value Ref Range Status   05/25/2025 0455 5.90 (H) 4.80 - 5.60 % Final       CT Abdomen Pelvis Without Contrast  Result Date: 5/24/2025   1. Pancreatic and peripancreatic fat induration and with pancreatic calcifications. Possible acute on chronic pancreatitis. Recommend correlation with amylase/lipase levels. 2. Cirrhosis. 3. Cholelithiasis. 4. Suspect bilateral renal scarring with left renal atrophy. 5. Bladder wall thickening, may be related underdistention or cystitis in the appropriate clinical and laboratory setting. 6. No urolithiasis or hydronephrosis.  This report was finalized on 5/24/2025 1:53 PM by Dr. Trae Campo M.D on Workstation: [id number]        I have personally reviewed all medications:  Scheduled Medications  famotidine, 40 mg, Oral, Daily  Lidocaine, 1 patch, Transdermal, Q24H  sodium chloride, 10 mL, Intravenous, Q12H  thiamine, 100 mg, Oral, Daily    Infusions  sodium chloride, 100 mL/hr, Last Rate: 100 mL/hr (05/24/25 2240)    Diet  Diet: Regular/House, Diabetic, Cardiac; Healthy Heart (2-3 Na+); Consistent Carbohydrate; Fluid Consistency: Thin (IDDSI 0)    I have personally reviewed:  [x]  Laboratory   [x]  Microbiology   [x]  Radiology   [x]  EKG/Telemetry  [x]  Cardiology/Vascular   []  Pathology    []  Records       Assessment/Plan     Active Hospital Problems    Diagnosis  POA    **LUQ abdominal pain [R10.12]  Yes    Stage 3a chronic kidney disease [N18.31]  Yes    Alcohol abuse [F10.10]  Yes    Macrocytic anemia [D53.9]  Yes    History of PIH (pregnancy  dementia [facility])  Assessment & Plan  · Patient was seen by neurology in the past and diagnosed with dementia  · He was started on Aricept 10 mg daily which his wife discontinued approximately 1 month ago due to concerns of a personality change  · Patient's home nocturnal regimen of Xanax 0 5 mg, a large glass of wine, Tylenol arthritis plus Tylenol PM is not optimal in the geriatric population  · He also notes worsening gait and ambulatory dysfunction  · Confer with geriatrics team  · TSH, folate normal    Hypothyroidism  Assessment & Plan  Continue Synthroid    Benign prostatic hyperplasia  Assessment & Plan  Patient has history of BPH and follows with urology  Continue Flomax  Denies any current urinary symptoms    Anxiety  Assessment & Plan  · Patient has a history of anxiety  · Is prescribed Prozac 80 mg daily as well as Xanax 0 5 mg at bedtime  · PDMP website was queried, no red flags    * Fall  Assessment & Plan  Patient is an 76-year-old male with past medical history significant for hypertension, hypothyroidism, mild dementia who presents to the ER for evaluation of a fall  · Patient's wife notes this is his second fall in the last few months  · Patient notes he awakened during the night to use the restroom, he stand up, began ambulating in his legs gave out  He notes generalized weakness in both legs  He fell  He was unable to get up, so crawled to his wife's room to get help  · He notes at baseline he has an unsteady gait, but does not ambulate with a walker  · Patient denies any current pain anywhere after the fall: He notes at baseline he has chronic pain involving the entirety of both of his legs  · Patient denies any preceding dizziness, lightheadedness, chest pain, palpitations, or syncope  He notes he fell because his legs gave out  · Patient had a CT scan of his brain without any acute abnormalities    Noted to have abrasions on both knees from crawling however no other signs or symptoms of "trauma  Full range of motion of extremities without pain  · consult PT/OT  · Fall precautions             VTE Pharmacologic Prophylaxis:   Pharmacologic: Heparin  Mechanical VTE Prophylaxis in Place: No        Education and Discussions with Family / Patient: wife updated over the phone    Time Spent for Care: 30 minutes  More than 50% of total time spent on counseling and coordination of care as described above  Current Length of Stay: 1 day(s)    Current Patient Status: Inpatient   Certification Statement: The patient will continue to require additional inpatient hospital stay due to ECHO pending    Discharge Plan: pending PT recommendations    Code Status: Level 1 - Full Code      Subjective:   [de-identified]year old male admitted for ambulatory dysfunction/recurrent falls  Patient feels well  He complains of chronic \"leg pain\" which he feels is contributing to his falls  Objective:     Vitals:   Temp (24hrs), Av 7 °F (36 5 °C), Min:97 5 °F (36 4 °C), Max:98 °F (36 7 °C)    Temp:  [97 5 °F (36 4 °C)-98 °F (36 7 °C)] 97 8 °F (36 6 °C)  HR:  [] 70  Resp:  [16-24] 18  BP: (142-172)/(63-83) 163/76  SpO2:  [85 %-98 %] 95 %  Body mass index is 24 94 kg/m²  Input and Output Summary (last 24 hours): Intake/Output Summary (Last 24 hours) at 2023 1228  Last data filed at 2023 1153  Gross per 24 hour   Intake 1571 25 ml   Output 975 ml   Net 596 25 ml       Physical Exam:     Physical Exam  Vitals reviewed  Constitutional:       General: He is not in acute distress  Appearance: He is not ill-appearing or diaphoretic  HENT:      Head: Normocephalic and atraumatic  Nose: Nose normal       Mouth/Throat:      Pharynx: Oropharynx is clear  Eyes:      Conjunctiva/sclera: Conjunctivae normal    Cardiovascular:      Rate and Rhythm: Normal rate  Pulmonary:      Effort: Pulmonary effort is normal  No respiratory distress     Abdominal:      General: Bowel sounds are normal  There is no " induced hypertension) [O13.9]  Yes      Resolved Hospital Problems   No resolved problems to display.       40-year-old female with history of regular alcohol use presenting with severe LUQ and epigastric abdominal pain radiating to the back. No tenderness over ribs and no evidence of rash. Lipase is within normal limits, but non-contrast CT demonstrated pancreatic calcifications and induration of peripancreatic fat, suggestive of possible chronic pancreatitis. Also noted were cirrhotic morphology of the liver and moderate splenomegaly. No definitive acute process identified on the non-contrast study to explain her pain.    AFVSS  Normal O2 sats  INR 1.14  B12 and folic acid normal  Hemoglobin 10.1 down from 10.9  Creatinine 1.60 down from 1.71  HSV antibodies pending  Hepatitis studies pending  A1c 5.9%  Magnesium and phosphorus improved    CT abdomen/pelvis without contrast 5/24 possible acute on chronic pancreatitis with evidence cirrhosis and cholelithiasis, renal scarring possible cystitis      Etiology of LUQ pain unclear.  No tenderness over the ribs and no history of trauma.  Reviewed CT scan above however this was done without contrast.  Unusual that CT is showing possible chronic pancreatitis however patient denies any prior knowledge of ever having pancreatitis.  Will proceed with contrasted CT abdomen and pelvis. Her renal function is abnormal but stable therefore appropriate for contrast at this time.  Will continue IV fluids for at least a few hours following procedure.  Nephrology has been consulted.    Contrasted CT will allow better evaluation of the spleen to assess for infarct, abscess, or vascular compromise as well as clarify pancreatic findings and rule out acute exacerbation, necrosis, or peripancreatic collections.  Given evidence of cirrhosis will also assess for portal hypertension and potential splenic vein thrombosis.    Contrast will also better evaluate renal parenchyma and vasculature  distension  Palpations: Abdomen is soft  Tenderness: There is no abdominal tenderness  Musculoskeletal:      Comments: Left knee with small amount of swelling, right knee with small superficial abrasion medially   Skin:     General: Skin is warm and dry  Findings: No bruising or erythema  Neurological:      General: No focal deficit present  Mental Status: He is alert  Psychiatric:         Mood and Affect: Mood normal          Behavior: Behavior normal            Additional Data:     Labs:    Results from last 7 days   Lab Units 06/11/23  0558 06/10/23  1406 06/10/23  0926   EOS PCT %  --   --  3   HEMATOCRIT % 37 2  --  38 8   HEMOGLOBIN g/dL 12 3  --  12 8   LYMPHS PCT %  --   --  13*   MONOS PCT %  --   --  10   NEUTROS PCT %  --   --  73   PLATELETS Thousands/uL 279   < > 302   WBC Thousand/uL 5 85  --  8 80    < > = values in this interval not displayed  Results from last 7 days   Lab Units 06/11/23  0558   ANION GAP mmol/L 3*   BUN mg/dL 16   CALCIUM mg/dL 8 7   CHLORIDE mmol/L 107   CO2 mmol/L 28   CREATININE mg/dL 0 85   GLUCOSE RANDOM mg/dL 95   POTASSIUM mmol/L 3 9   SODIUM mmol/L 138                           * I Have Reviewed All Lab Data Listed Above  * Additional Pertinent Lab Tests Reviewed:  [facility] Review[facility]      Recent Cultures (last 7 days):           Last 24 Hours Medication List:   Current Facility-Administered Medications   Medication Dose Route Frequency Provider Last Rate   • ALPRAZolam  0 5 mg Oral HS PRN Sherlene Schilder, MD     • amLODIPine  10 mg Oral HS Sherlene Schilder, MD     • docusate sodium  100 mg Oral BID Sherlene Schilder, MD     • FLUoxetine  80 mg Oral Daily Sherlene Schilder, MD     • folic acid  1 mg Oral Daily Sherlene Schilder, MD     • heparin (porcine)  5,000 Units Subcutaneous [facility] Sherlene Schilder, MD     • lisinopril  20 mg Oral Daily Sherlene Schilder, MD      And   • hydrochlorothiazide  25 mg Oral Daily Sherlene Schilder, more definitively, given left renal cortical loss and atrophy.    Will add Percocet for pain for now.    History of significant alcohol abuse however states she has not had anything in at least a week.  She ports typically drinking 2 to 3 glasses of wine per evening when she is preparing dinner.    Continue monitoring electrolytes    Follow-up renal and GI consults.      SCDs for DVT prophylaxis.  Full code.  Discussed with patient and nursing staff.  Anticipate discharge home in 1-2 days.  Expected Discharge Date: 5/26/2025; Expected Discharge Time:       Janes Mejia MD  [facility]  05/25/25  11:02 EDT   MD     • levothyroxine  75 mcg Oral Early Morning Nely Novak MD     • multivitamin-minerals  1 tablet Oral Daily Nely Novak MD     • ondansetron  4 mg Intravenous Q6H PRN Nely Novak MD     • sodium chloride  75 mL/hr Intravenous Continuous Nely Novak MD 75 mL/hr (06/11/23 0227)   • tamsulosin  0 4 mg Oral Daily With Seema Campos MD     • thiamine  100 mg Oral Daily Nely Novak MD          Today, Patient Was Seen By: Kristal Munoz PA-C    ** Please Note: Dictation voice to text software may have been used in the creation of this document   **

## 2025-05-25 NOTE — PLAN OF CARE
Goal Outcome Evaluation:            A/Ox4, pleasant. VSS. RA. Independent. Percocet PRN. CT abdomen with contrast: Unchanged from the previous one. Renal u/s completed/ pending results. Fluids to continue for 6 hours after CT w/contrast. Urine sample collected.  Care plan ongoing

## 2025-05-25 NOTE — PLAN OF CARE
Goal Outcome Evaluation:         Pt is alert and oriented x 4. CIWA score of 0. Pt's pain managed with x 1 dose of percocet and lidocaine patch, but pt states patch not effective. Pt is on RA VSS.

## 2025-05-26 VITALS
RESPIRATION RATE: 16 BRPM | HEIGHT: 61 IN | HEART RATE: 84 BPM | OXYGEN SATURATION: 96 % | WEIGHT: 128.09 LBS | TEMPERATURE: 98.6 F | DIASTOLIC BLOOD PRESSURE: 83 MMHG | BODY MASS INDEX: 24.18 KG/M2 | SYSTOLIC BLOOD PRESSURE: 116 MMHG

## 2025-05-26 LAB
ALBUMIN SERPL-MCNC: 3.3 G/DL (ref 3.5–5.2)
ALBUMIN/GLOB SERPL: 0.9 G/DL
ALP SERPL-CCNC: 174 U/L (ref 39–117)
ALPHA-FETOPROTEIN: 2.69 NG/ML (ref 0–8.3)
ALT SERPL W P-5'-P-CCNC: 20 U/L (ref 1–33)
ANION GAP SERPL CALCULATED.3IONS-SCNC: 14.3 MMOL/L (ref 5–15)
AST SERPL-CCNC: 23 U/L (ref 1–32)
BASOPHILS # BLD MANUAL: 0 10*3/MM3 (ref 0–0.2)
BASOPHILS NFR BLD MANUAL: 0 % (ref 0–1.5)
BILIRUB SERPL-MCNC: 0.3 MG/DL (ref 0–1.2)
BUN SERPL-MCNC: 13 MG/DL (ref 6–20)
BUN/CREAT SERPL: 7.8 (ref 7–25)
CALCIUM SPEC-SCNC: 8.6 MG/DL (ref 8.6–10.5)
CHLORIDE SERPL-SCNC: 100 MMOL/L (ref 98–107)
CO2 SERPL-SCNC: 19.7 MMOL/L (ref 22–29)
CREAT SERPL-MCNC: 1.67 MG/DL (ref 0.57–1)
DEPRECATED RDW RBC AUTO: 48.8 FL (ref 37–54)
EGFRCR SERPLBLD CKD-EPI 2021: 39.3 ML/MIN/1.73
EOSINOPHIL # BLD MANUAL: 0.05 10*3/MM3 (ref 0–0.4)
EOSINOPHIL NFR BLD MANUAL: 1 % (ref 0.3–6.2)
ERYTHROCYTE [DISTWIDTH] IN BLOOD BY AUTOMATED COUNT: 13.5 % (ref 12.3–15.4)
FERRITIN SERPL-MCNC: 742 NG/ML (ref 13–150)
GLOBULIN UR ELPH-MCNC: 3.7 GM/DL
GLUCOSE SERPL-MCNC: 125 MG/DL (ref 65–99)
HCT VFR BLD AUTO: 28.9 % (ref 34–46.6)
HGB BLD-MCNC: 9.9 G/DL (ref 12–15.9)
IRON 24H UR-MRATE: 68 MCG/DL (ref 37–145)
IRON SATN MFR SERPL: 23 % (ref 20–50)
LIPASE SERPL-CCNC: 24 U/L (ref 13–60)
LYMPHOCYTES # BLD MANUAL: 1.44 10*3/MM3 (ref 0.7–3.1)
LYMPHOCYTES NFR BLD MANUAL: 9 % (ref 5–12)
MAGNESIUM SERPL-MCNC: 1.3 MG/DL (ref 1.6–2.6)
MCH RBC QN AUTO: 33.7 PG (ref 26.6–33)
MCHC RBC AUTO-ENTMCNC: 34.3 G/DL (ref 31.5–35.7)
MCV RBC AUTO: 98.3 FL (ref 79–97)
MONOCYTES # BLD: 0.48 10*3/MM3 (ref 0.1–0.9)
NEUTROPHILS # BLD AUTO: 3.36 10*3/MM3 (ref 1.7–7)
NEUTROPHILS NFR BLD MANUAL: 63 % (ref 42.7–76)
NRBC BLD AUTO-RTO: 0 /100 WBC (ref 0–0.2)
PHOSPHATE SERPL-MCNC: 3.4 MG/DL (ref 2.5–4.5)
PLAT MORPH BLD: NORMAL
PLATELET # BLD AUTO: 175 10*3/MM3 (ref 140–450)
PMV BLD AUTO: 10.6 FL (ref 6–12)
POTASSIUM SERPL-SCNC: 4.1 MMOL/L (ref 3.5–5.2)
PROT SERPL-MCNC: 7 G/DL (ref 6–8.5)
RBC # BLD AUTO: 2.94 10*6/MM3 (ref 3.77–5.28)
RBC MORPH BLD: NORMAL
SODIUM SERPL-SCNC: 134 MMOL/L (ref 136–145)
T4 FREE SERPL-MCNC: 1.3 NG/DL (ref 0.92–1.68)
TIBC SERPL-MCNC: 299 MCG/DL (ref 298–536)
TRANSFERRIN SERPL-MCNC: 201 MG/DL (ref 200–360)
TSH SERPL DL<=0.05 MIU/L-ACNC: 4.94 UIU/ML (ref 0.27–4.2)
VARIANT LYMPHS NFR BLD MANUAL: 27 % (ref 19.6–45.3)
WBC MORPH BLD: NORMAL
WBC NRBC COR # BLD AUTO: 5.33 10*3/MM3 (ref 3.4–10.8)

## 2025-05-26 PROCEDURE — 84439 ASSAY OF FREE THYROXINE: CPT | Performed by: STUDENT IN AN ORGANIZED HEALTH CARE EDUCATION/TRAINING PROGRAM

## 2025-05-26 PROCEDURE — 83735 ASSAY OF MAGNESIUM: CPT | Performed by: HOSPITALIST

## 2025-05-26 PROCEDURE — 83690 ASSAY OF LIPASE: CPT | Performed by: HOSPITALIST

## 2025-05-26 PROCEDURE — 83540 ASSAY OF IRON: CPT | Performed by: INTERNAL MEDICINE

## 2025-05-26 PROCEDURE — 85007 BL SMEAR W/DIFF WBC COUNT: CPT | Performed by: HOSPITALIST

## 2025-05-26 PROCEDURE — 86381 MITOCHONDRIAL ANTIBODY EACH: CPT

## 2025-05-26 PROCEDURE — 82728 ASSAY OF FERRITIN: CPT | Performed by: INTERNAL MEDICINE

## 2025-05-26 PROCEDURE — 82105 ALPHA-FETOPROTEIN SERUM: CPT | Performed by: INTERNAL MEDICINE

## 2025-05-26 PROCEDURE — 84100 ASSAY OF PHOSPHORUS: CPT | Performed by: HOSPITALIST

## 2025-05-26 PROCEDURE — 84466 ASSAY OF TRANSFERRIN: CPT | Performed by: INTERNAL MEDICINE

## 2025-05-26 PROCEDURE — 80050 GENERAL HEALTH PANEL: CPT | Performed by: HOSPITALIST

## 2025-05-26 PROCEDURE — 25010000002 MAGNESIUM SULFATE 2 GM/50ML SOLUTION: Performed by: STUDENT IN AN ORGANIZED HEALTH CARE EDUCATION/TRAINING PROGRAM

## 2025-05-26 RX ORDER — MAGNESIUM SULFATE HEPTAHYDRATE 40 MG/ML
2 INJECTION, SOLUTION INTRAVENOUS
Status: COMPLETED | OUTPATIENT
Start: 2025-05-26 | End: 2025-05-26

## 2025-05-26 RX ORDER — OXYCODONE AND ACETAMINOPHEN 5; 325 MG/1; MG/1
1 TABLET ORAL EVERY 4 HOURS PRN
Qty: 8 TABLET | Refills: 0 | Status: SHIPPED | OUTPATIENT
Start: 2025-05-26 | End: 2025-05-26

## 2025-05-26 RX ORDER — OXYCODONE AND ACETAMINOPHEN 5; 325 MG/1; MG/1
1 TABLET ORAL EVERY 4 HOURS PRN
Qty: 8 TABLET | Refills: 0 | Status: SHIPPED | OUTPATIENT
Start: 2025-05-26

## 2025-05-26 RX ADMIN — MAGNESIUM SULFATE HEPTAHYDRATE 2 G: 40 INJECTION, SOLUTION INTRAVENOUS at 12:03

## 2025-05-26 RX ADMIN — OXYCODONE AND ACETAMINOPHEN 1 TABLET: 5; 325 TABLET ORAL at 08:52

## 2025-05-26 RX ADMIN — MAGNESIUM SULFATE HEPTAHYDRATE 2 G: 40 INJECTION, SOLUTION INTRAVENOUS at 08:52

## 2025-05-26 RX ADMIN — MAGNESIUM SULFATE HEPTAHYDRATE 2 G: 40 INJECTION, SOLUTION INTRAVENOUS at 10:52

## 2025-05-26 RX ADMIN — OXYCODONE AND ACETAMINOPHEN 1 TABLET: 5; 325 TABLET ORAL at 02:29

## 2025-05-26 RX ADMIN — Medication 100 MG: at 08:52

## 2025-05-26 RX ADMIN — FAMOTIDINE 40 MG: 20 TABLET, FILM COATED ORAL at 08:52

## 2025-05-26 NOTE — PROGRESS NOTES
Access did follow up with pt. Pt is getting ready for discharge. Pt did not feel she needed any outpt resources.Access will sign off.

## 2025-05-26 NOTE — CASE MANAGEMENT/SOCIAL WORK
Discharge Planning Assessment  [facility]     Patient Name: Stephanie Appiah  MRN: [id number]  Today's Date: 5/26/2025    Admit Date: 5/24/2025    Plan: Home, friend to transport   Discharge Needs Assessment       Row Name 05/26/25 1333       Living Environment    People in Home child(sanjeev), dependent    Current Living Arrangements home    Potentially Unsafe Housing Conditions none    In the past 12 months has the electric, gas, oil, or water company threatened to shut off services in your home? No    Primary Care Provided by self    Provides Primary Care For no one    Family Caregiver if Needed other (see comments)  ex     Quality of Family Relationships unable to assess    Able to Return to Prior Arrangements yes       Resource/Environmental Concerns    Resource/Environmental Concerns none    Transportation Concerns none       Transportation Needs    In the past 12 months, has lack of transportation kept you from medical appointments or from getting medications? no    In the past 12 months, has lack of transportation kept you from meetings, work, or from getting things needed for daily living? No       Food Insecurity    Within the past 12 months, you worried that your food would run out before you got the money to buy more. Never true    Within the past 12 months, the food you bought just didn't last and you didn't have money to get more. Never true       Transition Planning    Patient/Family Anticipates Transition to home;home with family    Patient/Family Anticipated Services at Transition none    Transportation Anticipated car, drives self       Discharge Needs Assessment    Readmission Within the Last 30 Days no previous admission in last 30 days    Equipment Currently Used at Home none    Concerns to be Addressed no discharge needs identified;denies needs/concerns at this time    Do you want help finding or keeping work or a job? I do not need or want help    Do you want help with school or  training? For example, starting or completing job training or getting a high school diploma, GED or equivalent No    Anticipated Changes Related to Illness none    Equipment Needed After Discharge none                   Discharge Plan       Row Name 05/26/25 0913       Plan    Plan Home, friend to transport    Patient/Family in Agreement with Plan yes    Plan Comments Met with pt at bedside. Introduced self and explained role of . Face sheet verified, pt does not have a PCP, declined CCP's assistance with obtaining one.Pt ade any difficulty paying for medications, and she wants her medications from [facility].  Pt lives with her dependent children and stated that her ex  can assist with any needs that may arise. Pt is independent in ADL's, and she does not use any assistive devices Pt has never had home health or been to SNF/Rehab. She intends to return home at discharge. Upon discharge, friend to transport.                    Expected Discharge Date and Time       Expected Discharge Date Expected Discharge Time    May 26, 2025            Demographic Summary    No documentation.                  Functional Status    No documentation.                  Psychosocial    No documentation.                  Abuse/Neglect    No documentation.                  Legal    No documentation.                  Substance Abuse    No documentation.                  Patient Forms    No documentation.                     Kyung Andrade, RN

## 2025-05-26 NOTE — CONSULTS
[facility]   Consult Note    Patient Name: Stephanie Appiah  : 1985  MRN: [id number]  Primary Care Physician:  Provider, No Known  Referring Physician: No Known Provider  Date of admission: 2025    Consults  Subjective   Subjective     Reason for Consult/ Chief Complaint: Abdominal pain    Abdominal Pain  Associated symptoms: constipation    Associated symptoms: no fever, no nausea and no vomiting      Stephanie Appiah is a 40 y.o. female presenting with epigastric left upper quadrant pain. Patient states she has had the symptoms for almost a week. She states it is sharp. Initially she noticed that nothing seemed to make it better or worse but then later noted that she was more comfortable lying on the left side. Otherwise it is not affected by position or movement. Not affected by oral intake or bowel movements.   She reports constipation and no having regular Bms for a couple days. She is tolerating regular food very promising. She denies nausea, vomiting or chills. No melena or hematochezia.     History of significant alcohol abuse however states she has not had anything in at least a week. She ports typically drinking 2 to 3 glasses of wine per evening when she is preparing dinner.        CT   1. Similar to the previous examination, there is peripancreatic phlegmon  consistent with pancreatitis. There is no focal peripancreatic fluid  collection seen. There are pancreatic calcifications, suspect chronic  pancreatitis. No pancreatic ductal dilatation. No pancreatic mass. The  stomach and duodenum have a satisfactory appearance. Gallstones and/or  sludge demonstrated within the dependent portion of the gallbladder,  there is mild gallbladder wall thickening; however, no peripancreatic  fluid or biliary duct dilatation.     2. Similar to the previous examination, there is surface irregularity of  the liver, consistent with cirrhosis. The spleen does not appear grossly  enlarged. Both  adrenal glands are normal. The left kidney is somewhat  lobulated in contour with small focal areas of cortical loss/thinning  seen. The right kidney is satisfactory in appearance. No calculus or  mass on the right or left, no obstructive uropathy seen. Diameter of the  aorta is normal. Retroaortic left renal vein, anatomic variants. There  is diffuse bladder wall thickening, no diverticulum or stone. The  ovaries are symmetric and satisfactory in appearance. There is a  metallic clip adjacent to the right adnexa, uterus is satisfactory in  size and shape.     3. The appendix, colon and small bowel are normal. No abnormality within  the left lower quadrant to explain the patient's discomfort. No free  intraperitoneal gas nor fluid seen.         Review of Systems   Constitutional:  Negative for fatigue and fever.   HENT:  Negative for trouble swallowing.    Gastrointestinal:  Positive for abdominal pain and constipation. Negative for blood in stool, nausea and vomiting.        Personal History     Past Medical History:   Diagnosis Date    ACL tear     ADHD     Disease of thyroid gland     PCL deficiency, knee, left     Torn meniscus        Past Surgical History:   Procedure Laterality Date    BREAST AUGMENTATION  2004     SECTION      KNEE SURGERY         Family History: family history includes Breast cancer in her maternal grandmother; No Known Problems in her father and mother; Ovarian cancer in her maternal aunt. Otherwise pertinent FHx was reviewed and not pertinent to current issue.    Social History:  reports that she has never smoked. She has never been exposed to tobacco smoke. She has never used smokeless tobacco. She reports current alcohol use of about 8.0 standard drinks of alcohol per week. She reports that she does not use drugs.    Home Medications:        Allergies:  Allergies   Allergen Reactions    Hydrocodone Itching    Insulin Detemir Other (See Comments)     Redness and itching at  injection site       Objective    Objective     Vitals:  Temp:  [97.7 °F (36.5 °C)-98.2 °F (36.8 °C)] 97.7 °F (36.5 °C)  Heart Rate:  [84-87] 87  Resp:  [16-18] 16  BP: (123-133)/(76-85) 133/81    Physical Exam  Constitutional:       General: She is not in acute distress.  Cardiovascular:      Rate and Rhythm: Normal rate.      Pulses: Normal pulses.   Pulmonary:      Effort: Pulmonary effort is normal.   Abdominal:      General: Bowel sounds are normal.      Palpations: Abdomen is soft.   Skin:     Coloration: Skin is not jaundiced.      Findings: No bruising.   Neurological:      General: No focal deficit present.      Mental Status: She is alert.   Psychiatric:         Mood and Affect: Mood normal.         Result Review    Result Review:  I have personally reviewed the results from the time of this admission to 5/26/2025 00:10 EDT and agree with these findings:  [x]  Laboratory list / accordion  [x]  Microbiology  [x]  Radiology  []  EKG/Telemetry   []  Cardiology/Vascular   []  Pathology  []  Old records  []  Other:  Most notable findings include:   AST 21, 25  ALT 22, 17  Plt 152, 142  TB 0.4, 0.4  -H    Assessment & Plan   Assessment / Plan     Brief Patient Summary:  Stephanie Appiah is a 40 y.o. female who presents with:  .- Abdominal pain. LUQ  ..-ETOH  .-Cirrhosis on CT scan  .- Anemia      Active Hospital Problems:  Active Hospital Problems    Diagnosis     **LUQ abdominal pain     Stage 3a chronic kidney disease     Alcohol abuse     Macrocytic anemia     History of PIH (pregnancy induced hypertension)      Plan:   .- Cirrhosis. Noted on CT scan. Hep labs negative. LFTs normal. GGT +  .-Replace electrolytes as needed.   .- Iron labs normal.   .- Cholelithiasis: No acute cholecystitis. Could consider HIDA in the near future.   .- Pt tolerating diet  .- GI following.     Hitesh Geronimo, APRN

## 2025-05-26 NOTE — PROGRESS NOTES
[facility] progress note      Patient Name: Stephanie Appiah  : 1985  MRN: [id number]  Primary Care Physician:  Provider, No Known  Referring Physician: No Known Provider  Date of admission: 2025    Subjective     Reason for follow up: Management of chronic kidney disease stage IIIb    Background:  Stephanie Appiah is a 40 y.o. female who got admitted to the hospital on 2025.    The patient has history of ADHD.  She had 2 pregnancies in the past and developed HELLP syndrome.  Both times she had to deliver her babies early.  She developed worsening of the kidney function both times.    She presented to the hospital complaining of pain involving the left lower rib area and extending to the back.  The pain was sharp and did not have any aggravating factors.  The pain was getting better whenever she lies on her left side.  The pain started about 4 days before admission.    The patient has been having serum creatinine around 1.3 to 1.6 mg/dL at least since 2019.    She had CT scan of the abdomen which was suggestive of chronic pancreatitis.  The patient has history of alcohol abuse.  There is a possibility of cirrhosis based on CT scan of the abdomen.    Interval history:  Chart was reviewed.  Events were noted.  The patient does not have fever.  The lower rib pain is better.  She denies having any urinary symptoms.    Review of Systems:   14 point review of systems is otherwise negative except for mentioned above    Allergies:  Allergies   Allergen Reactions    Hydrocodone Itching    Insulin Detemir Other (See Comments)     Redness and itching at injection site       Objective     Vitals:   Temp:  [97.7 °F (36.5 °C)-98.6 °F (37 °C)] 98.6 °F (37 °C)  Heart Rate:  [84-87] 84  Resp:  [16-18] 16  BP: (116-133)/(76-83) 116/83    Intake/Output Summary (Last 24 hours) at 2025 0910  Last data filed at 2025 0847  Gross per 24 hour   Intake 480 ml   Output --    Net 480 ml       Physical Exam:   Constitutional: Awake, alert, no acute distress.  HEENT: Sclera anicteric, no conjunctival injection  Neck: Supple, no thyromegaly, no lymphadenopathy, trachea at midline, no JVD  Respiratory: Clear to auscultation bilaterally, nonlabored respiration  Cardiovascular: RRR, no murmurs, no rubs or gallops, no carotid bruit  Gastrointestinal: Positive bowel sounds, abdomen is soft, nontender and nondistended  : No palpable bladder  Musculoskeletal: No edema, no clubbing or cyanosis  Psychiatric: Appropriate affect, cooperative  Neurologic: Oriented x3, moving all extremities, normal speech and mental status  Skin: Warm and dry       Scheduled Meds:     famotidine, 40 mg, Oral, Daily  Lidocaine, 1 patch, Transdermal, Q24H  magnesium sulfate, 2 g, Intravenous, Q2H  thiamine, 100 mg, Oral, Daily      IV Meds:          Results Reviewed:   I have personally reviewed the results from the time of this admission to 5/26/2025 09:10 EDT     Lab Results   Component Value Date    GLUCOSE 125 (H) 05/26/2025    CALCIUM 8.6 05/26/2025     (L) 05/26/2025    K 4.1 05/26/2025    CO2 19.7 (L) 05/26/2025     05/26/2025    BUN 13 05/26/2025    CREATININE 1.67 (H) 05/26/2025    EGFRIFNONA 45 (L) 07/04/2020    BCR 7.8 05/26/2025    ANIONGAP 14.3 05/26/2025      Lab Results   Component Value Date    MG 1.3 (L) 05/26/2025    PHOS 3.4 05/26/2025    ALBUMIN 3.3 (L) 05/26/2025     US Renal Bilateral  Result Date: 5/25/2025  US RENAL BILATERAL-  Clinical: Abnormal serum creatinine  COMPARISON CT abdomen performed earlier in the day at 5:03 p.m.  FINDINGS: The right kidney is 8.4 cm in length and the left kidney is 7.3 cm in length, both kidneys are small. No obstructive uropathy on the right or left. Only the right ureteral jet could be documented. No renal mass. There are areas of left renal cortical thinning and lobulation. The remainder is unremarkable.  This report was finalized on 5/25/2025  6:08 PM by Dr. Alec Benavidez M.D on Workstation: [id number]       I reviewed CT scan of the abdomen and pelvis results from 5/24/2025.  The patient has atrophic appearing left kidney and normal right kidney.  There was no evidence of hydronephrosis.    I reviewed renal ultrasound from May 25, 2025.  Both kidneys are smallish.  No evidence of hydronephrosis.  There is some cortical thinning in the left kidney.    Assessment / Plan     ASSESSMENT:  1.  Chronic kidney disease stage IIIb, most likely this is related to incomplete recovery of previous acute kidney injury that happened when she developed HELLP syndrome during her previous 2 pregnancies.  She does not have proteinuria  2.  Left rib pain  3.  Prediabetes  4.  Chronic pancreatitis based on CT scan results  5.  Cirrhosis, possibly related to alcohol abuse.  This is based on CT scan results  6.  Anemia, iron stores are acceptable    PLAN:  1.  Kidney function is stabilizing.  It will be monitored.  The patient does not have proteinuria  2.  Imaging was reviewed.  Appreciate GI input  3.  IV fluids were stopped.  The patient was encouraged to increase oral intake  4.  The patient will need better control of her blood sugar since she has prediabetes.  I discussed with her losing weight and following a low-carb diet  5.  I will monitor hemoglobin.  Iron stores are acceptable    Thank you for involving us in the care of Stephanie Appiah.  Please feel free to call with any questions.    Lu Guillory MD  05/26/25  09:10 EDT    [facility]  [phone]      Much of this encounter note is an electronic transcription/translation of spoken language to printed text. The electronic translation of spoken language may permit erroneous, or at times, nonsensical words or phrases to be inadvertently transcribed; Although I have reviewed the note for such errors, some may still exist.

## 2025-05-26 NOTE — DISCHARGE SUMMARY
"    Patient Name: Stephanie Appiah  : 1985  MRN: [id number]    Date of Admission: 2025  Date of Discharge:  2025  Primary Care Physician: Provider, No Known      Chief Complaint:   Abdominal Pain      Discharge Diagnoses     Active Hospital Problems    Diagnosis  POA    **LUQ abdominal pain [R10.12]  Yes    Stage 3a chronic kidney disease [N18.31]  Yes    Alcohol abuse [F10.10]  Yes    Macrocytic anemia [D53.9]  Yes    History of PIH (pregnancy induced hypertension) [O13.9]  Yes      Resolved Hospital Problems   No resolved problems to display.        Admitting HPI     \"Pleasant 40-year-old female states she woke up Monday morning with sharp pain and she points to the left lower rib area about mid clavicular line on the left and extending back to the posterior axillary line. She states it is sharp. Initially she noticed that nothing seemed to make it better or worse but then later noted that she was more comfortable lying on the left side. Otherwise it is not affected by position or movement. Not affected by oral intake or bowel movements. She noted that on Tuesday she she had a few dry heaves. Bowel movements were normal until she decided to take a laxative in case this was constipation. That resulted in a loose stool. Last bowel movement 2 days ago. No fever sweats or chills. She was seen at an urgent care center 2 days ago and was given an antibiotic and is unclear as to why the antibiotic was prescribed. She states she knows that she drinks too much. She has about 24 drinks a week. She states she has never been through alcohol withdrawal. She stopped drinking when she noted the pain earlier this week. On questioning she states she is also never had pancreatitis. On questioning still noting the left rib pain. \"    Hospital Course     Pt admitted for abdominal pain, imaging showing acute on pancreatitis as well as cirrhosis.  She was seen in consultation with GI and nephrology.  Discussed with " patient and she noted years long history of drinking a bottle of wine with dinner, discussed complete cessation moving forward.  Her abdominal pain improved with supportive care and she is now tolerating diet.  She will need follow-up with GI.  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  Noted to have no proteinuria on her urinalysis.  Patient is feeling much better is requesting discharge.  Given her improvement in abdominal pain no acute medical reason to remain in the hospital.  TSH was found to be mildly elevated and will defer to PCP for ongoing management.    Day of Discharge     Physical Exam:  Temp:  [97.7 °F (36.5 °C)-98.6 °F (37 °C)] 98.6 °F (37 °C)  Heart Rate:  [84-87] 84  Resp:  [16-18] 16  BP: (116-133)/(81-83) 116/83  Body mass index is 24.2 kg/m².  Physical Exam  Constitutional:       General: She is not in acute distress.     Appearance: She is ill-appearing.   Pulmonary:      Effort: Pulmonary effort is normal. No respiratory distress.      Breath sounds: No stridor.   Skin:     Coloration: Skin is not jaundiced.      Findings: No bruising.   Neurological:      General: No focal deficit present.      Mental Status: She is alert.   Psychiatric:         Mood and Affect: Mood normal.         Behavior: Behavior normal.         Consultants     Consult Orders (all) (From admission, onward)       Start     Ordered    05/25/25 0838  Inpatient Nephrology Consult  Once        Specialty:  Nephrology  Provider:  Corey Lucas MD    05/25/25 0837    05/25/25 0512  Inpatient Nephrology Consult  Once,   Status:  Canceled        Specialty:  Nephrology  Provider:  Thor Alexander MD    05/25/25 0512 05/24/25 2033  Inpatient Gastroenterology Consult  Once        Specialty:  Gastroenterology  Provider:  Hermes Buckner MD    05/24/25 2032 05/24/25 2032  Inpatient Nephrology Consult   Once,   Status:  Canceled        Specialty:  Nephrology  Provider:  (Not yet assigned)    05/24/25 2032 05/24/25 2014  Inpatient Access Center Consult  Once        Provider:  (Not yet assigned)    05/24/25 2020                  Procedures     * Surgery not found *      Imaging Results (All)       Procedure Component Value Units Date/Time    US Renal Bilateral [[id number]] Collected: 05/25/25 1805     Updated: 05/25/25 1811    Narrative:      US RENAL BILATERAL-     Clinical: Abnormal serum creatinine     COMPARISON CT abdomen performed earlier in the day at 5:03 p.m.     FINDINGS: The right kidney is 8.4 cm in length and the left kidney is  7.3 cm in length, both kidneys are small. No obstructive uropathy on the  right or left. Only the right ureteral jet could be documented. No renal  mass. There are areas of left renal cortical thinning and lobulation.  The remainder is unremarkable.     This report was finalized on 5/25/2025 6:08 PM by Dr. Alec Benavidez M.D  on Workstation: [id number]       CT Abdomen Pelvis With Contrast [[id number]] Collected: 05/25/25 1729     Updated: 05/25/25 1755    Narrative:      CT ABDOMEN PELVIS W CONTRAST-     Radiation dose reduction techniques were utilized, including automated  exposure control and exposure modulation based on body size.     CLINICAL: Left lower quadrant abdominal pain.     COMPARISON: 05/24/2025 CT abdomen and pelvis.     FINDINGS:  1. Similar to the previous examination, there is peripancreatic phlegmon  consistent with pancreatitis. There is no focal peripancreatic fluid  collection seen. There are pancreatic calcifications, suspect chronic  pancreatitis. No pancreatic ductal dilatation. No pancreatic mass. The  stomach and duodenum have a satisfactory appearance. Gallstones and/or  sludge demonstrated within the dependent portion of the gallbladder,  there is mild gallbladder wall thickening; however, no peripancreatic  fluid or biliary duct dilatation.      2. Similar to the previous examination, there is surface irregularity of  the liver, consistent with cirrhosis. The spleen does not appear grossly  enlarged. Both adrenal glands are normal. The left kidney is somewhat  lobulated in contour with small focal areas of cortical loss/thinning  seen. The right kidney is satisfactory in appearance. No calculus or  mass on the right or left, no obstructive uropathy seen. Diameter of the  aorta is normal. Retroaortic left renal vein, anatomic variants. There  is diffuse bladder wall thickening, no diverticulum or stone. The  ovaries are symmetric and satisfactory in appearance. There is a  metallic clip adjacent to the right adnexa, uterus is satisfactory in  size and shape.     3. The appendix, colon and small bowel are normal. No abnormality within  the left lower quadrant to explain the patient's discomfort. No free  intraperitoneal gas nor fluid seen.     CONCLUSION: Pancreatitis similar to 05/24/2025, no abnormality in the  left lower quadrant. There is bladder wall thickening as before,  possible cystitis. Uterus and ovaries are satisfactory in appearance.  Liver morphology consistent with cirrhosis. Gallstones and/or sludge  with gallbladder wall thickening.              This report was finalized on 5/25/2025 5:52 PM by Dr. Alec Benavidez M.D  on Workstation: [id number]       CT Abdomen Pelvis Without Contrast [[id number]] Collected: 05/24/25 1342     Updated: 05/24/25 1356    Narrative:      CT ABDOMEN PELVIS WO CONTRAST-     INDICATION: Flank pain. Suspect kidney stone.     COMPARISON: CTA chest July 4, 2020     TECHNIQUE:  Routine CT abdomen/pelvis without IV contrast. Coronal and sagittal  reformats. Radiation dose reduction techniques were utilized, including  automated exposure control and exposure modulation based on body size.     FINDINGS:      Lung bases: Bilateral breast implants.     ABDOMEN: Cirrhotic morphology. Cholelithiasis. No biliary  ductal  dilatation. Spleen is at upper limits in size. Moderate to severe  pancreatic lipomatosis. Pancreatic calcifications. Induration in the  pancreatic body and the adjacent peripancreatic fat. No pancreatic  ductal dilatation or mass seen. No adrenal nodules. Lobular renal  contours with multifocal cortical loss seen in the left kidney, suspect  scarring. Atrophic appearing left kidney measuring 7.3 cm. Right kidney  measures 9.2 cm in craniocaudal dimension. No urolithiasis. No  hydronephrosis.     Pelvis: Bladder wall thickening, with under distention. No bladder  calculus. Uterus is anteverted. Tubal ligation clips with the left tubal  ligation clip seen in the cul-de-sac, removed from the expected location  of the left lobe into. Trace fluid in the cul-de-sac, within physiologic  limits.     Bowel: No small bowel obstruction. Normal appendix.     Abdominal wall: Tiny fat-containing umbilical hernia. Pelvic wall  scarring.     Retroperitoneum: No lymphadenopathy.     Vasculature: Prominent umbilical vein. No abdominal aortic aneurysm.  Circumaortic left renal vein.     Osseous structures: No destructive osseous lesions.       Impression:         1. Pancreatic and peripancreatic fat induration and with pancreatic  calcifications. Possible acute on chronic pancreatitis. Recommend  correlation with amylase/lipase levels.  2. Cirrhosis.  3. Cholelithiasis.  4. Suspect bilateral renal scarring with left renal atrophy.  5. Bladder wall thickening, may be related underdistention or cystitis  in the appropriate clinical and laboratory setting.  6. No urolithiasis or hydronephrosis.     This report was finalized on 5/24/2025 1:53 PM by Dr. Trae Campo M.D on Workstation: [id number]                 Pertinent Labs     Results from last 7 days   Lab Units 05/26/25  0145 05/25/25  0455 05/24/25  1237   WBC 10*3/mm3 5.33 5.40 6.49   HEMOGLOBIN g/dL 9.9* 10.1* 10.9*   PLATELETS 10*3/mm3 175 142 152     Results from  "last 7 days   Lab Units 05/26/25  0145 05/25/25  0455 05/24/25 1933 05/24/25  1237   SODIUM mmol/L 134* 136 137 137   POTASSIUM mmol/L 4.1 3.8 3.4* 2.8*   CHLORIDE mmol/L 100 101 103 102   CO2 mmol/L 19.7* 21.0* 22.9 24.3   BUN mg/dL 13 14 15 18   CREATININE mg/dL 1.67* 1.60* 1.56* 1.71*   GLUCOSE mg/dL 125* 166* 103* 168*   EGFR mL/min/1.73 39.3* 41.4* 42.7* 38.2*     Results from last 7 days   Lab Units 05/26/25 0145 05/25/25 0455 05/24/25  1237   ALBUMIN g/dL 3.3* 3.3* 3.7   BILIRUBIN mg/dL 0.3 0.4 0.4   ALK PHOS U/L 174* 157* 184*   AST (SGOT) U/L 23 24 21   ALT (SGPT) U/L 20 17 22     Results from last 7 days   Lab Units 05/26/25 0145 05/25/25 0455 05/24/25 1933 05/24/25  1237   CALCIUM mg/dL 8.6 8.1* 8.7 8.9   ALBUMIN g/dL 3.3* 3.3*  --  3.7   MAGNESIUM mg/dL 1.3* 1.7 1.9 1.2*   PHOSPHORUS mg/dL 3.4 5.4* 1.7*  --      Results from last 7 days   Lab Units 05/26/25 0145 05/24/25  1237   LIPASE U/L 24 41     Results from last 7 days   Lab Units 05/24/25  1933   CK TOTAL U/L 39     Results from last 7 days   Lab Units 05/25/25  1648   SODIUM UR mmol/L 86   CREATININE UR mg/dL 25.1   PROTEIN TOTAL URINE mg/dL <4.0         Invalid input(s): \"LDLCALC\"          Test Results Pending at Discharge     Pending Labs       Order Current Status    HSV 1 & 2 - Specific Antibody, IgG In process    Hepatitis B Core Antibody, Total In process    Hepatitis B DNA, Quantitative, PCR In process    Mitochondrial Antibodies, M2 In process            Discharge Details        Discharge Medications        New Medications        Instructions Start Date   oxyCODONE-acetaminophen 5-325 MG per tablet  Commonly known as: PERCOCET   1 tablet, Oral, Every 4 Hours PRN               Allergies   Allergen Reactions    Hydrocodone Itching    Insulin Detemir Other (See Comments)     Redness and itching at injection site       Discharge Disposition:  Home or Self Care      Discharge Diet:  Diet Order   Procedures    Diet: Regular/House; Fluid " Consistency: Thin (IDDSI 0)       Discharge Activity:   Activity Instructions       Activity as Tolerated              CODE STATUS:    Code Status and Medical Interventions: CPR (Attempt to Resuscitate); Full Support   Ordered at: 05/24/25 2020     Code Status (Patient has no pulse and is not breathing):    CPR (Attempt to Resuscitate)     Medical Interventions (Patient has pulse or is breathing):    Full Support       No future appointments.   Follow-up Information       Provider, No Known .    Contact information:  [facility] [id number] [phone]               Susana Arteaga PA-C Follow up in 1 month(s).    Specialties: Physician Assistant, Gastroenterology  Contact information:  [address]  [facility] [address]  [facility] [address]  [phone]               Lu Guillory MD Follow up.    Specialty: Nephrology  Contact information:  [address]  [facility] [address]  [facility] [address]  [phone]                             Time Spent on Discharge:  Greater than 30 minutes spent on discharge management including final examination, discussion of hospital stay and patient education, preparation of records, medication reconciliation, follow up planning      oTmmy Dukes MD  [facility]  05/26/25  13:30 EDT

## 2025-05-26 NOTE — CASE MANAGEMENT/SOCIAL WORK
Case Management Discharge Note      Final Note: Home, friend to transport         Selected Continued Care - Discharged on 5/26/2025 Admission date: 5/24/2025 - Discharge disposition: Home or Self Care      Destination    No services have been selected for the patient.                Durable Medical Equipment    No services have been selected for the patient.                Dialysis/Infusion    No services have been selected for the patient.                Home Medical Care    No services have been selected for the patient.                Therapy    No services have been selected for the patient.                Community Resources    No services have been selected for the patient.                Community & DME    No services have been selected for the patient.                    Transportation Services  Private: Car    Final Discharge Disposition Code: 01 - home or self-care

## 2025-05-27 ENCOUNTER — READMISSION MANAGEMENT (OUTPATIENT)
Dept: CALL CENTER | Facility: HOSPITAL | Age: 40
End: 2025-05-27
Payer: COMMERCIAL

## 2025-05-27 NOTE — PAYOR COMM NOTE
"Stephanie Rachel (40 y.o. Female)      SEE FOR INPATIENT:  REF#  [id number]    UR: FAX-  [phone]      [phone]    [facility]: NPI [id number]  [facility]# [id number]    LENORE OLSEN RN,[facility]       Date of Birth   1985    Social Security Number       Address   [address] [place] [address]    Home Phone   [phone]    MRN   [id number]       Restorationism   None    Marital Status                               Admission Date   2025    Admission Type   Urgent    Admitting Provider   Luis Davis MD    Attending Provider       Department, Room/Bed   [address], [address]/       Discharge Date   2025    Discharge Disposition   Home or Self Care    Discharge Destination                                 Attending Provider: (none)   Allergies: Hydrocodone, Insulin Detemir    Isolation: None   Infection: None   Code Status: Prior    Ht: 154.9 cm (61\")   Wt: 58.1 kg (128 lb 1.4 oz)    Admission Cmt: None   Principal Problem: LUQ abdominal pain [R10.12]                   Active Insurance as of 2025       Primary Coverage       Payor Plan Insurance Group Employer/Plan Group    [employer] EMPLOYEE [id number]       Payor Plan Address Payor Plan Phone Number Payor Plan Fax Number Effective Dates    [address] [phone]  2025 - None Entered    [place] [address]         Subscriber Name Subscriber Birth Date Member ID       STEPHANIE RACHEL 1985 [id number]                     Emergency Contacts        (Rel.) Home Phone Work Phone Mobile Phone    Satish Watkins (Spouse) [phone] -- [phone]                 History & Physical        Luis Davis MD at 25          HISTORY AND PHYSICAL   [facility]        Patient Identification:  Name: Stephanie Rachel  Age: 40 y.o.  Sex: female  :  1985  MRN: [id number]                     Primary Care " Physician: Provider, No Known    Chief Complaint: Left lower rib pain.    History of Present Illness:   Pleasant 40-year-old female states she woke up Monday morning with sharp pain and she points to the left lower rib area about mid clavicular line on the left and extending back to the posterior axillary line.  She states it is sharp.  Initially she noticed that nothing seemed to make it better or worse but then later noted that she was more comfortable lying on the left side.  Otherwise it is not affected by position or movement.  Not affected by oral intake or bowel movements.  She noted that on Tuesday she she had a few dry heaves.  Bowel movements were normal until she decided to take a laxative in case this was constipation.  That resulted in a loose stool.  Last bowel movement 2 days ago.  No fever sweats or chills.  She was seen at an urgent care center 2 days ago and was given an antibiotic and is unclear as to why the antibiotic was prescribed.  She states she knows that she drinks too much.  She has about 24 drinks a week.  She states she has never been through alcohol withdrawal.  She stopped drinking when she noted the pain earlier this week.  On questioning she states she is also never had pancreatitis.  On questioning still noting the left rib pain.      Past Medical History:  Past Medical History:   Diagnosis Date    ACL tear     ADHD     Disease of thyroid gland     PCL deficiency, knee, left     Torn meniscus      Past Surgical History:  Past Surgical History:   Procedure Laterality Date    BREAST AUGMENTATION  2004     SECTION      KNEE SURGERY        Home Meds:  No medications prior to admission.       Allergies:  Allergies   Allergen Reactions    Hydrocodone Itching    Insulin Detemir Other (See Comments)     Redness and itching at injection site     Immunizations:  Immunization History   Administered Date(s) Administered    Flu Vaccine Split Quad 2016    Fluzone (or  Fluarix & Flulaval for VFC) >6mos 2016    Hep B, Adolescent or Pediatric 2002, 2002, 2003    Tdap 2017     Social History:   Social History     Social History Narrative    Not on file     Social History     Tobacco Use    Smoking status: Never     Passive exposure: Never    Smokeless tobacco: Never   Substance Use Topics    Alcohol use: Yes     Alcohol/week: 8.0 standard drinks of alcohol     Types: 8 Glasses of wine per week     Family History:  Family History   Problem Relation Age of Onset    Ovarian cancer Maternal Aunt     Breast cancer Maternal Grandmother     No Known Problems Mother     No Known Problems Father         Review of Systems  Review of Systems   Constitutional: Negative.    HENT: Negative.     Eyes: Negative.    Respiratory: Negative.     Cardiovascular: Negative.    Gastrointestinal:         As per history of present illness.   Endocrine: Negative.    Genitourinary: Negative.    Musculoskeletal:         As per history of present illness.   Skin: Negative.    Allergic/Immunologic: Negative.    Neurological: Negative.    Hematological: Negative.    Psychiatric/Behavioral: Negative.         Objective:  T Max 24 hrs: Temp (24hrs), Av.9 °F (37.2 °C), Min:98.9 °F (37.2 °C), Max:98.9 °F (37.2 °C)    Vitals Ranges:   Temp:  [98.9 °F (37.2 °C)] 98.9 °F (37.2 °C)  Heart Rate:  [84-87] 84  Resp:  [16-18] 18  BP: (131-149)/(93-96) 131/93      Exam:  Physical Exam  Constitutional:       General: She is not in acute distress.     Appearance: Normal appearance. She is normal weight. She is not ill-appearing, toxic-appearing or diaphoretic.   HENT:      Head: Normocephalic and atraumatic.      Right Ear: External ear normal.      Left Ear: External ear normal.      Nose: Nose normal.      Mouth/Throat:      Mouth: Mucous membranes are moist.   Eyes:      General: No scleral icterus.        Right eye: No discharge.         Left eye: No discharge.      Extraocular Movements:  Extraocular movements intact.      Conjunctiva/sclera: Conjunctivae normal.   Cardiovascular:      Rate and Rhythm: Normal rate and regular rhythm.      Heart sounds:      No friction rub. No gallop.   Pulmonary:      Effort: Pulmonary effort is normal.      Breath sounds: Normal breath sounds.      Comments: There is tenderness along about the bottom 3 of the lower ribs on the left extending from the mid scapular line posteriorly around to the mid clavicular line anteriorly.  No palpable abnormalities.  No abdominal tenderness.  No CVA tenderness.  Chest:      Chest wall: Tenderness present.   Abdominal:      General: Abdomen is flat. Bowel sounds are normal. There is no distension.      Palpations: Abdomen is soft. There is no mass.      Tenderness: There is no abdominal tenderness. There is no guarding or rebound.      Comments: See pulmonary exam.   Musculoskeletal:      Cervical back: Neck supple.      Right lower leg: No edema.      Left lower leg: No edema.   Skin:     General: Skin is warm and dry.      Comments: No rash in the area of the left lower rib/chest pain area.  It is not tender to light touch.   Neurological:      General: No focal deficit present.      Mental Status: She is alert and oriented to person, place, and time.   Psychiatric:         Mood and Affect: Mood normal.         Behavior: Behavior normal.         Thought Content: Thought content normal.         Judgment: Judgment normal.         Data Review:  All labs and radiology reviewed.    Assessment:  Left rib pain: Unknown etiology.  It is not point specific.  And personally viewing the CT scan I do not see an explanation for this.  Does appear to have a radicular pattern.  Consider the possibility of emerging shingles.  Will check HSV-1 antibodies.  Consider the possibility of radicular T-spine pain but I would expect more of a positional component.  For the meantime treat symptomatically with Lidoderm patch and nonnarcotic oral pain  medications  Hypokalemia: Replace and monitor.  LIBIA versus worsening CKD 3B.  Creatinine 1.71.  Baseline 1.33.  This been 5 years since her last creatinine level however.  I suspect worsening of CKD.  Gentle hydration overnight.  She will need nephrology follow-up.  If creatinine stable this could be done here or as an outpatient.  Patient states kidney issues were due to pregnancy complications.  Abnormal pancreas on CT scan: Changes consistent with chronic pancreatitis.  No clinical indication of acute pancreatitis.  Alcohol abuse: Access consult.  Reportedly last drink was at least 5 days ago.  Initiate thiamine.  No evidence of withdrawal.  Monitor.  Hypomagnesemia: Likely secondary to above.  Replace and monitor.  Check phosphate levels.  Cirrhosis: Noted on CT scan.  Very possibly secondary to EtOH.  Check hepatitis profile.  She will need GI follow-up.  This could be started here or as an outpatient.  Cholelithiasis: No acute cholecystitis.  Monitor.  Macrocytic anemia: Likely secondary to EtOH.  Check B12, folate.  Hyperglycemia: Check A1c.  Monitor.      Plan:  Please see above.  Discussed with patient.  Discussed with RN.  Discussed with referring provider.    Luis Davis MD  5/24/2025  19:39 EDT    EMR Dragon/Transcription disclaimer:   Much of this encounter note is an electronic transcription/translation of spoken language to printed text. The electronic translation of spoken language may permit erroneous, or at times, nonsensical words or phrases to be inadvertently transcribed; Although I have reviewed the note for such errors, some may still exist.     Addendum:  Discussed with the patient the need for nephrology evaluation follow-up as well as gastroenterology.  She used to see nephrology at [facility] but has been at least a few years due to her lack of follow-up.  She has not seen a gastroenterologist before.  She is very amenable to seeing a gastroenterologist and nephrology here and I will  go ahead and place that consult.  She is also very much aware of her need to stop EtOH consumption and is amenable to counseling in this respect.  Electronically signed by Luis Davis MD, 05/24/25, 8:33 PM EDT.      Electronically signed by Luis Davis MD at 05/24/25 2034          Emergency Department Notes        Carolee Felix RN at 05/24/25 1232          Pt was informed that the visit today will be transitioned from Urgent Care to Emergency Room Care and billing. Understanding expressed, ER ACKNOWLEDGEMENT form signed, and all questions answered.      Electronically signed by Carolee Felix RN at 05/24/25 1232       Ange Bennett MD at 05/24/25 1206          Subjective   History of Present Illness  Patient is a 40-year-old female presenting with epigastric left upper quadrant pain.  Patient states she has had the symptoms for almost a week.  She followed up with her primary care doctor and was treated for UTI though her UA was negative for infection.  She states the pain is sharp and wakes her up at night.  She does drink significantly up to 24 drinks a week though she has not drank the last week due to the pain.  She has had some nausea and constipation no fever or vomiting.  No bowel movement since last week Friday and that was with the use of laxatives.      Review of Systems   Constitutional:  Negative for chills and fever.   HENT:  Negative for congestion, rhinorrhea and sore throat.    Eyes:  Negative for pain and visual disturbance.   Respiratory:  Negative for apnea, cough, chest tightness and shortness of breath.    Cardiovascular:  Negative for chest pain and palpitations.   Gastrointestinal:  Positive for abdominal pain and nausea. Negative for diarrhea and vomiting.   Genitourinary:  Negative for difficulty urinating and dysuria.   Musculoskeletal:  Negative for joint swelling and myalgias.   Skin:  Negative for color change.   Neurological:  Negative for seizures and headaches.    Psychiatric/Behavioral: Negative.     All other systems reviewed and are negative.      Past Medical History:   Diagnosis Date    ACL tear     ADHD     Disease of thyroid gland     PCL deficiency, knee, left     Torn meniscus        Allergies   Allergen Reactions    Hydrocodone Itching    Insulin Detemir Other (See Comments)     Redness and itching at injection site       Past Surgical History:   Procedure Laterality Date    BREAST AUGMENTATION  2004     SECTION      KNEE SURGERY         Family History   Problem Relation Age of Onset    Ovarian cancer Maternal Aunt     Breast cancer Maternal Grandmother     No Known Problems Mother     No Known Problems Father        Social History     Socioeconomic History    Marital status:     Number of children: 1   Tobacco Use    Smoking status: Never     Passive exposure: Never    Smokeless tobacco: Never   Substance and Sexual Activity    Alcohol use: Yes     Comment: occ    Drug use: No    Sexual activity: Yes     Partners: Male           Objective   Physical Exam  Vitals and nursing note reviewed.   Constitutional:       General: She is not in acute distress.     Appearance: Normal appearance. She is not toxic-appearing.   HENT:      Head: Normocephalic and atraumatic.      Jaw: There is normal jaw occlusion.   Eyes:      General: Lids are normal.      Extraocular Movements: Extraocular movements intact.      Conjunctiva/sclera: Conjunctivae normal.      Pupils: Pupils are equal, round, and reactive to light.   Cardiovascular:      Rate and Rhythm: Normal rate and regular rhythm.      Pulses: Normal pulses.      Heart sounds: Normal heart sounds.   Pulmonary:      Effort: Pulmonary effort is normal. No respiratory distress.      Breath sounds: Normal breath sounds. No wheezing or rhonchi.   Abdominal:      General: Abdomen is flat.      Palpations: Abdomen is soft.      Tenderness: There is abdominal tenderness. There is no guarding or rebound.       Comments: Left upper quadrant epigastric tenderness   Musculoskeletal:         General: Normal range of motion.      Cervical back: Normal range of motion and neck supple.      Right lower leg: No edema.      Left lower leg: No edema.   Skin:     General: Skin is warm and dry.   Neurological:      Mental Status: She is alert and oriented to person, place, and time. Mental status is at baseline.   Psychiatric:         Mood and Affect: Mood normal.         Procedures          ED Course  ED Course as of 05/24/25 1502   Sat May 24, 2025   1407 Patient's creatinine is 1.71 previous was greater than 3.  Hemoglobin is 10.9 which is her baseline.  hCG negative lipase 41 magnesium 1.2 with a potassium of 2.8.  CT is concerning for cirrhosis and acute on chronic pancreatitis.  Patient has significant left upper quadrant pain and CT findings consistent with acute pancreatitis.  Even though her lipase is 41 this still meets criteria for acute pancreatitis.  Patient given fluids and pain medications. [LQ]   1450 I spoke with the hospitalist Dr. Davis who is agreeable to admit the patient to telemetry for acute on chronic pancreatitis with intractable pain. [LQ]      ED Course User Index  [LQ] Ange Bennett MD                             Medical Decision Making  Problems Addressed:  Acute pancreatitis, unspecified complication status, unspecified pancreatitis type: complicated acute illness or injury    Amount and/or Complexity of Data Reviewed  Labs: ordered.  Radiology: ordered.    Risk  Prescription drug management.  Decision regarding hospitalization.        Final diagnoses:   Acute pancreatitis, unspecified complication status, unspecified pancreatitis type       ED Disposition  ED Disposition       ED Disposition   Decision to Admit    Condition   --    Comment   Level of Care: Telemetry [5]   Diagnosis: Acute pancreatitis [577.0.ICD-9-CM]   Admitting Physician: ABBIE DAVIS [[id number]]   Certification: I Certify That  Inpatient Hospital Services Are Medically Necessary For Greater Than 2 Midnights                 No follow-up provider specified.       Medication List      No changes were made to your prescriptions during this visit.           Electronically signed by Ange Bennett MD at 05/24/25 1502       Vital Signs (last 3 days)      Date/Time Temp Temp src Pulse Resp BP Patient Position SpO2    05/26/25 0755 98.6 (37) Oral 84 16 116/83 Lying 96    05/25/25 2325 97.7 (36.5) Oral 87 16 133/81 Lying 92    05/25/25 1940 97.9 (36.6) Oral 84 18 123/82 Sitting 99    05/25/25 1300 97.7 (36.5) Oral 86 18 124/76 Lying 100    05/25/25 0720 98.2 (36.8) Oral 86 18 127/85 Lying 98    05/25/25 0009 98.1 (36.7) Oral 79 18 114/92 Lying 99    05/24/25 2325 98.1 (36.7) -- 94 -- 131/95 -- 99    05/24/25 2210 98.8 (37.1) -- 101 -- 137/90 -- 98    05/24/25 1652 98.9 (37.2) -- -- 18 131/93 Lying 100    05/24/25 1647 -- -- 84 -- 131/93 -- 100    05/24/25 1208 98.9 (37.2) Oral 87 16 149/96 -- 99          Oxygen Therapy (last 3 days)       Date/Time SpO2 Device (Oxygen Therapy) Flow (L/min) (Oxygen Therapy) Oxygen Concentration (%) ETCO2 (mmHg)    05/26/25 0847 -- room air -- -- --    05/26/25 0755 96 -- -- -- --    05/25/25 2325 92 -- -- -- --    05/25/25 2221 -- room air -- -- --    05/25/25 2030 -- room air -- -- --    05/25/25 1940 99 -- -- -- --    05/25/25 1411 -- room air -- -- --    05/25/25 1300 100 room air -- -- --    05/25/25 0815 -- room air -- -- --    05/25/25 0720 98 room air -- -- --    05/25/25 0420 -- room air -- -- --    05/25/25 0210 -- room air -- -- --    05/25/25 0009 99 room air -- -- --    05/24/25 2325 99 -- -- -- --    05/24/25 2210 98 -- -- -- --    05/24/25 1652 100 -- -- -- --    05/24/25 1647 100 -- -- -- --    05/24/25 1208 99 -- -- -- --          Intake & Output (last 3 days)         05/24 0701 05/25 0700 05/25 0701 05/26 0700 05/26 0701 05/27 0700 05/27 0701 05/28 0700    P.O. 750 240 240     I.V. (mL/kg) 1200  (20.7)       Total Intake(mL/kg) 1950 (33.6) 240 (4.1) 240 (4.1)     Net +1950 +240 +240             Urine Unmeasured Occurrence 3 x              Lines, Drains & Airways       Active LDAs       None              Inactive LDAs       Name Placement date Placement time Removal date Removal time Site Days    [REMOVED] Peripheral IV 05/24/25 1237 20 G Left Antecubital 05/24/25  1237  05/26/25  1345  Antecubital  2             Lab Results (all)       Procedure Component Value Units Date/Time    T4, Free [[id number]]  (Normal) Collected: 05/26/25 0145    Specimen: Blood Updated: 05/26/25 1152     Free T4 1.30 ng/dL     TSH Rfx On Abnormal To Free T4 [[id number]]  (Abnormal) Collected: 05/26/25 0145    Specimen: Blood Updated: 05/26/25 1121     TSH 4.940 uIU/mL     AFP Tumor Marker [[id number]]  (Normal) Collected: 05/26/25 0145    Specimen: Blood Updated: 05/26/25 0445     ALPHA-FETOPROTEIN 2.69 ng/mL     Narrative:      Alpha Fetoprotein Tumor Marker Reference Range:    0.0-8.3 ng/mL    Note: Normal values apply only to males and nonpregnant females. These results are not interpretable for pregnant females.    Testing Method: Roche [employer] Electrochemiluminescence Immunoassay(ECLIA)  Values obtained with different assay methods or kits cannot be used interchangeably.    Phosphorus [[id number]]  (Normal) Collected: 05/26/25 0145    Specimen: Blood Updated: 05/26/25 0438     Phosphorus 3.4 mg/dL     Lipase [[id number]]  (Normal) Collected: 05/26/25 0145    Specimen: Blood Updated: 05/26/25 0437     Lipase 24 U/L     Magnesium [[id number]]  (Abnormal) Collected: 05/26/25 0145    Specimen: Blood Updated: 05/26/25 0437     Magnesium 1.3 mg/dL     Iron Profile [[id number]]  (Normal) Collected: 05/26/25 0145    Specimen: Blood Updated: 05/26/25 0437     Iron 68 mcg/dL      Iron Saturation (TSAT) 23 %      Transferrin 201 mg/dL      TIBC 299 mcg/dL     Comprehensive Metabolic Panel [[id number]]  (Abnormal) Collected: 05/26/25 0145     Specimen: Blood Updated: 05/26/25 0437     Glucose 125 mg/dL      BUN 13 mg/dL      Creatinine 1.67 mg/dL      Sodium 134 mmol/L      Potassium 4.1 mmol/L      Chloride 100 mmol/L      CO2 19.7 mmol/L      Calcium 8.6 mg/dL      Total Protein 7.0 g/dL      Albumin 3.3 g/dL      ALT (SGPT) 20 U/L      AST (SGOT) 23 U/L      Alkaline Phosphatase 174 U/L      Total Bilirubin 0.3 mg/dL      Globulin 3.7 gm/dL      A/G Ratio 0.9 g/dL      BUN/Creatinine Ratio 7.8     Anion Gap 14.3 mmol/L      eGFR 39.3 mL/min/1.73     Narrative:      GFR Categories in Chronic Kidney Disease (CKD)              GFR Category          GFR (mL/min/1.73)    Interpretation  G1                    90 or greater        Normal or high (1)  G2                    60-89                Mild decrease (1)  G3a                   45-59                Mild to moderate decrease  G3b                   30-44                Moderate to severe decrease  G4                    15-29                Severe decrease  G5                    14 or less           Kidney failure    (1)In the absence of evidence of kidney disease, neither GFR category G1 or G2 fulfill the criteria for CKD.    eGFR calculation 2021 CKD-EPI creatinine equation, which does not include race as a factor    Ferritin [[id number]]  (Abnormal) Collected: 05/26/25 0145    Specimen: Blood Updated: 05/26/25 0437     Ferritin 742.00 ng/mL     Narrative:      Results may be falsely decreased if patient taking Biotin.      Manual Differential [[id number]]  (Normal) Collected: 05/26/25 0145    Specimen: Blood Updated: 05/26/25 0238     Neutrophil % 63.0 %      Lymphocyte % 27.0 %      Monocyte % 9.0 %      Eosinophil % 1.0 %      Basophil % 0.0 %      Neutrophils Absolute 3.36 10*3/mm3      Lymphocytes Absolute 1.44 10*3/mm3      Monocytes Absolute 0.48 10*3/mm3      Eosinophils Absolute 0.05 10*3/mm3      Basophils Absolute 0.00 10*3/mm3      RBC Morphology Normal     WBC Morphology Normal     Platelet  Morphology Normal    CBC & Differential [[id number]]  (Abnormal) Collected: 05/26/25 0145    Specimen: Blood Updated: 05/26/25 0157    Narrative:      The following orders were created for panel order CBC & Differential.  Procedure                               Abnormality         Status                     ---------                               -----------         ------                     CBC Auto Differential[[id number]]        Abnormal            Final result                 Please view results for these tests on the individual orders.    CBC Auto Differential [[id number]]  (Abnormal) Collected: 05/26/25 0145    Specimen: Blood Updated: 05/26/25 0157     WBC 5.33 10*3/mm3      RBC 2.94 10*6/mm3      Hemoglobin 9.9 g/dL      Hematocrit 28.9 %      MCV 98.3 fL      MCH 33.7 pg      MCHC 34.3 g/dL      RDW 13.5 %      RDW-SD 48.8 fl      MPV 10.6 fL      Platelets 175 10*3/mm3      nRBC 0.0 /100 WBC     Mitochondrial Antibodies, M2 [[id number]] Collected: 05/26/25 0145    Specimen: Blood Updated: 05/26/25 0150    Protein / Creatinine Ratio, Urine - Urine, Clean Catch [[id number]] Collected: 05/25/25 1648    Specimen: Urine, Clean Catch Updated: 05/25/25 1721     Protein/Creatinine Ratio, Urine --     Comment: Unable to calculate        Creatinine, Urine 25.1 mg/dL      Total Protein, Urine <4.0 mg/dL     Sodium, Urine, Random - Urine, Clean Catch [[id number]] Collected: 05/25/25 1648    Specimen: Urine, Clean Catch Updated: 05/25/25 1719     Sodium, Urine 86 mmol/L     Narrative:      Reference intervals for random urine have not been established.  Clinical usage is dependent upon physician's interpretation in combination with other laboratory tests.       Gamma GT [[id number]]  (Abnormal) Collected: 05/25/25 0456    Specimen: Blood Updated: 05/25/25 1407      U/L     Manual Differential [[id number]]  (Normal) Collected: 05/25/25 0455    Specimen: Blood Updated: 05/25/25 0621     Neutrophil % 70.8 %       Lymphocyte % 19.8 %      Monocyte % 7.3 %      Eosinophil % 2.1 %      Basophil % 0.0 %      Neutrophils Absolute 3.82 10*3/mm3      Lymphocytes Absolute 1.07 10*3/mm3      Monocytes Absolute 0.39 10*3/mm3      Eosinophils Absolute 0.11 10*3/mm3      Basophils Absolute 0.00 10*3/mm3      RBC Morphology Normal     WBC Morphology Normal     Platelet Morphology Normal    Folate [[id number]]  (Normal) Collected: 05/25/25 0456    Specimen: Blood Updated: 05/25/25 0614     Folate 8.16 ng/mL     Narrative:      Results may be falsely increased if patient taking Biotin.      Phosphorus [[id number]]  (Abnormal) Collected: 05/25/25 0455    Specimen: Blood Updated: 05/25/25 0614     Phosphorus 5.4 mg/dL     Hepatitis B Core Antibody, IgM [[id number]]  (Normal) Collected: 05/25/25 0456    Specimen: Blood Updated: 05/25/25 0613     Hep B C IgM Non-Reactive    Hepatitis C Antibody [[id number]]  (Normal) Collected: 05/25/25 0456    Specimen: Blood Updated: 05/25/25 0613     Hepatitis C Ab Non-Reactive    Vitamin B12 [[id number]]  (Normal) Collected: 05/25/25 0456    Specimen: Blood Updated: 05/25/25 0613     Vitamin B-12 531 pg/mL     Narrative:      Results may be falsely increased if patient taking Biotin.      Hepatitis B Surface Antigen [[id number]]  (Normal) Collected: 05/25/25 0455    Specimen: Blood Updated: 05/25/25 0557     Hepatitis B Surface Ag Non-Reactive    Comprehensive Metabolic Panel [[id number]]  (Abnormal) Collected: 05/25/25 0455    Specimen: Blood Updated: 05/25/25 0552     Glucose 166 mg/dL      BUN 14 mg/dL      Creatinine 1.60 mg/dL      Sodium 136 mmol/L      Potassium 3.8 mmol/L      Chloride 101 mmol/L      CO2 21.0 mmol/L      Calcium 8.1 mg/dL      Total Protein 6.9 g/dL      Albumin 3.3 g/dL      ALT (SGPT) 17 U/L      AST (SGOT) 24 U/L      Alkaline Phosphatase 157 U/L      Total Bilirubin 0.4 mg/dL      Globulin 3.6 gm/dL      A/G Ratio 0.9 g/dL      BUN/Creatinine Ratio 8.8     Anion Gap 14.0 mmol/L       eGFR 41.4 mL/min/1.73     Narrative:      GFR Categories in Chronic Kidney Disease (CKD)              GFR Category          GFR (mL/min/1.73)    Interpretation  G1                    90 or greater        Normal or high (1)  G2                    60-89                Mild decrease (1)  G3a                   45-59                Mild to moderate decrease  G3b                   30-44                Moderate to severe decrease  G4                    15-29                Severe decrease  G5                    14 or less           Kidney failure    (1)In the absence of evidence of kidney disease, neither GFR category G1 or G2 fulfill the criteria for CKD.    eGFR calculation 2021 CKD-EPI creatinine equation, which does not include race as a factor    Magnesium [[id number]]  (Normal) Collected: 05/25/25 0455    Specimen: Blood Updated: 05/25/25 0552     Magnesium 1.7 mg/dL     Protime-INR [[id number]]  (Abnormal) Collected: 05/25/25 0456    Specimen: Blood Updated: 05/25/25 0543     Protime 14.5 Seconds      INR 1.14    Hemoglobin A1c [[id number]]  (Abnormal) Collected: 05/25/25 0455    Specimen: Blood Updated: 05/25/25 0542     Hemoglobin A1C 5.90 %     Narrative:      Hemoglobin A1C Ranges:    Increased Risk for Diabetes  5.7% to 6.4%  Diabetes                     >= 6.5%  Diabetic Goal                < 7.0%    CBC Auto Differential [[id number]]  (Abnormal) Collected: 05/25/25 0455    Specimen: Blood Updated: 05/25/25 0542     WBC 5.40 10*3/mm3      RBC 3.02 10*6/mm3      Hemoglobin 10.1 g/dL      Hematocrit 30.5 %      .0 fL      MCH 33.4 pg      MCHC 33.1 g/dL      RDW 13.9 %      RDW-SD 50.8 fl      MPV 11.3 fL      Platelets 142 10*3/mm3      nRBC 0.0 /100 WBC     Hepatitis B DNA, Quantitative, PCR [[id number]] Collected: 05/25/25 0456    Specimen: Blood Updated: 05/25/25 0525    Hepatitis B Core Antibody, Total [[id number]] Collected: 05/25/25 0456    Specimen: Blood Updated: 05/25/25 0524    HSV 1 & 2 - Specific  Antibody, IgG [[id number]] Collected: 05/25/25 0456    Specimen: Blood Updated: 05/25/25 0524    CK [[id number]]  (Normal) Collected: 05/24/25 1933    Specimen: Blood Updated: 05/24/25 2042     Creatine Kinase 39 U/L     Phosphorus [[id number]]  (Abnormal) Collected: 05/24/25 1933    Specimen: Blood Updated: 05/24/25 2037     Phosphorus 1.7 mg/dL     Magnesium [[id number]]  (Normal) Collected: 05/24/25 1933    Specimen: Blood Updated: 05/24/25 2008     Magnesium 1.9 mg/dL     Basic Metabolic Panel [[id number]]  (Abnormal) Collected: 05/24/25 1933    Specimen: Blood Updated: 05/24/25 2008     Glucose 103 mg/dL      BUN 15 mg/dL      Creatinine 1.56 mg/dL      Sodium 137 mmol/L      Potassium 3.4 mmol/L      Chloride 103 mmol/L      CO2 22.9 mmol/L      Calcium 8.7 mg/dL      BUN/Creatinine Ratio 9.6     Anion Gap 11.1 mmol/L      eGFR 42.7 mL/min/1.73     Narrative:      GFR Categories in Chronic Kidney Disease (CKD)              GFR Category          GFR (mL/min/1.73)    Interpretation  G1                    90 or greater        Normal or high (1)  G2                    60-89                Mild decrease (1)  G3a                   45-59                Mild to moderate decrease  G3b                   30-44                Moderate to severe decrease  G4                    15-29                Severe decrease  G5                    14 or less           Kidney failure    (1)In the absence of evidence of kidney disease, neither GFR category G1 or G2 fulfill the criteria for CKD.    eGFR calculation 2021 CKD-EPI creatinine equation, which does not include race as a factor    Ethanol [[id number]] Collected: 05/24/25 1933    Specimen: Blood Updated: 05/24/25 2004     Ethanol <10 mg/dL      Ethanol % <0.010 %     Magnesium [[id number]]  (Abnormal) Collected: 05/24/25 1237    Specimen: Blood Updated: 05/24/25 1317     Magnesium 1.2 mg/dL     Comprehensive Metabolic Panel [[id number]]  (Abnormal) Collected: 05/24/25 1237    Specimen:  Blood Updated: 05/24/25 1301     Glucose 168 mg/dL      BUN 18 mg/dL      Creatinine 1.71 mg/dL      Sodium 137 mmol/L      Potassium 2.8 mmol/L      Chloride 102 mmol/L      CO2 24.3 mmol/L      Calcium 8.9 mg/dL      Total Protein 7.5 g/dL      Albumin 3.7 g/dL      ALT (SGPT) 22 U/L      AST (SGOT) 21 U/L      Alkaline Phosphatase 184 U/L      Total Bilirubin 0.4 mg/dL      Globulin 3.8 gm/dL      A/G Ratio 1.0 g/dL      BUN/Creatinine Ratio 10.5     Anion Gap 10.7 mmol/L      eGFR 38.2 mL/min/1.73     Narrative:      GFR Categories in Chronic Kidney Disease (CKD)              GFR Category          GFR (mL/min/1.73)    Interpretation  G1                    90 or greater        Normal or high (1)  G2                    60-89                Mild decrease (1)  G3a                   45-59                Mild to moderate decrease  G3b                   30-44                Moderate to severe decrease  G4                    15-29                Severe decrease  G5                    14 or less           Kidney failure    (1)In the absence of evidence of kidney disease, neither GFR category G1 or G2 fulfill the criteria for CKD.    eGFR calculation 2021 CKD-EPI creatinine equation, which does not include race as a factor    Lipase [[id number]]  (Normal) Collected: 05/24/25 1237    Specimen: Blood Updated: 05/24/25 1301     Lipase 41 U/L     hCG, Serum, Qualitative [[id number]]  (Normal) Collected: 05/24/25 1243    Specimen: Blood Updated: 05/24/25 1252     HCG Qualitative Negative    CBC & Differential [[id number]]  (Abnormal) Collected: 05/24/25 1237    Specimen: Blood Updated: 05/24/25 1249    Narrative:      The following orders were created for panel order CBC & Differential.  Procedure                               Abnormality         Status                     ---------                               -----------         ------                     CBC Auto Differential[[id number]]        Abnormal            Final result                  Please view results for these tests on the individual orders.    CBC Auto Differential [[id number]]  (Abnormal) Collected: 05/24/25 1237    Specimen: Blood Updated: 05/24/25 1249     WBC 6.49 10*3/mm3      RBC 3.28 10*6/mm3      Hemoglobin 10.9 g/dL      Hematocrit 32.6 %      MCV 99.4 fL      MCH 33.2 pg      MCHC 33.4 g/dL      RDW 13.5 %      RDW-SD 49.2 fl      MPV 10.8 fL      Platelets 152 10*3/mm3      Neutrophil % 71.0 %      Lymphocyte % 14.9 %      Monocyte % 8.8 %      Eosinophil % 0.3 %      Basophil % 0.2 %      Immature Grans % 4.8 %      Neutrophils, Absolute 4.61 10*3/mm3      Lymphocytes, Absolute 0.97 10*3/mm3      Monocytes, Absolute 0.57 10*3/mm3      Eosinophils, Absolute 0.02 10*3/mm3      Basophils, Absolute 0.01 10*3/mm3      Immature Grans, Absolute 0.31 10*3/mm3           Imaging Results (All)       Procedure Component Value Units Date/Time    US Renal Bilateral [[id number]] Collected: 05/25/25 1805     Updated: 05/25/25 1811    Narrative:      US RENAL BILATERAL-     Clinical: Abnormal serum creatinine     COMPARISON CT abdomen performed earlier in the day at 5:03 p.m.     FINDINGS: The right kidney is 8.4 cm in length and the left kidney is  7.3 cm in length, both kidneys are small. No obstructive uropathy on the  right or left. Only the right ureteral jet could be documented. No renal  mass. There are areas of left renal cortical thinning and lobulation.  The remainder is unremarkable.     This report was finalized on 5/25/2025 6:08 PM by Dr. Alec Benavidez M.D  on Workstation: [id number]       CT Abdomen Pelvis With Contrast [[id number]] Collected: 05/25/25 1729     Updated: 05/25/25 1755    Narrative:      CT ABDOMEN PELVIS W CONTRAST-     Radiation dose reduction techniques were utilized, including automated  exposure control and exposure modulation based on body size.     CLINICAL: Left lower quadrant abdominal pain.     COMPARISON: 05/24/2025 CT abdomen and pelvis.      FINDINGS:  1. Similar to the previous examination, there is peripancreatic phlegmon  consistent with pancreatitis. There is no focal peripancreatic fluid  collection seen. There are pancreatic calcifications, suspect chronic  pancreatitis. No pancreatic ductal dilatation. No pancreatic mass. The  stomach and duodenum have a satisfactory appearance. Gallstones and/or  sludge demonstrated within the dependent portion of the gallbladder,  there is mild gallbladder wall thickening; however, no peripancreatic  fluid or biliary duct dilatation.     2. Similar to the previous examination, there is surface irregularity of  the liver, consistent with cirrhosis. The spleen does not appear grossly  enlarged. Both adrenal glands are normal. The left kidney is somewhat  lobulated in contour with small focal areas of cortical loss/thinning  seen. The right kidney is satisfactory in appearance. No calculus or  mass on the right or left, no obstructive uropathy seen. Diameter of the  aorta is normal. Retroaortic left renal vein, anatomic variants. There  is diffuse bladder wall thickening, no diverticulum or stone. The  ovaries are symmetric and satisfactory in appearance. There is a  metallic clip adjacent to the right adnexa, uterus is satisfactory in  size and shape.     3. The appendix, colon and small bowel are normal. No abnormality within  the left lower quadrant to explain the patient's discomfort. No free  intraperitoneal gas nor fluid seen.     CONCLUSION: Pancreatitis similar to 05/24/2025, no abnormality in the  left lower quadrant. There is bladder wall thickening as before,  possible cystitis. Uterus and ovaries are satisfactory in appearance.  Liver morphology consistent with cirrhosis. Gallstones and/or sludge  with gallbladder wall thickening.              This report was finalized on 5/25/2025 5:52 PM by Dr. Alec Benavidez M.D  on Workstation: [id number]       CT Abdomen Pelvis Without Contrast [[id number]]  Collected: 05/24/25 1342     Updated: 05/24/25 1356    Narrative:      CT ABDOMEN PELVIS WO CONTRAST-     INDICATION: Flank pain. Suspect kidney stone.     COMPARISON: CTA chest July 4, 2020     TECHNIQUE:  Routine CT abdomen/pelvis without IV contrast. Coronal and sagittal  reformats. Radiation dose reduction techniques were utilized, including  automated exposure control and exposure modulation based on body size.     FINDINGS:      Lung bases: Bilateral breast implants.     ABDOMEN: Cirrhotic morphology. Cholelithiasis. No biliary ductal  dilatation. Spleen is at upper limits in size. Moderate to severe  pancreatic lipomatosis. Pancreatic calcifications. Induration in the  pancreatic body and the adjacent peripancreatic fat. No pancreatic  ductal dilatation or mass seen. No adrenal nodules. Lobular renal  contours with multifocal cortical loss seen in the left kidney, suspect  scarring. Atrophic appearing left kidney measuring 7.3 cm. Right kidney  measures 9.2 cm in craniocaudal dimension. No urolithiasis. No  hydronephrosis.     Pelvis: Bladder wall thickening, with under distention. No bladder  calculus. Uterus is anteverted. Tubal ligation clips with the left tubal  ligation clip seen in the cul-de-sac, removed from the expected location  of the left lobe into. Trace fluid in the cul-de-sac, within physiologic  limits.     Bowel: No small bowel obstruction. Normal appendix.     Abdominal wall: Tiny fat-containing umbilical hernia. Pelvic wall  scarring.     Retroperitoneum: No lymphadenopathy.     Vasculature: Prominent umbilical vein. No abdominal aortic aneurysm.  Circumaortic left renal vein.     Osseous structures: No destructive osseous lesions.       Impression:         1. Pancreatic and peripancreatic fat induration and with pancreatic  calcifications. Possible acute on chronic pancreatitis. Recommend  correlation with amylase/lipase levels.  2. Cirrhosis.  3. Cholelithiasis.  4. Suspect bilateral  renal scarring with left renal atrophy.  5. Bladder wall thickening, may be related underdistention or cystitis  in the appropriate clinical and laboratory setting.  6. No urolithiasis or hydronephrosis.     This report was finalized on 2025 1:53 PM by Dr. Trae Campo M.D on Workstation: [id number]             ECG/EMG Results (all)       Procedure Component Value Units Date/Time    Telemetry Scan [[id number]] Resulted: 25     Updated: 25 190    Telemetry Scan [[id number]] Resulted: 25     Updated: 25 1933        Physician Progress Notes (all)        Lu Guillory MD at 25 0910            [facility] progress note      Patient Name: Stephanie Appiah  : 1985  MRN: [id number]  Primary Care Physician:  Provider, No Known  Referring Physician: No Known Provider  Date of admission: 2025    Subjective     Reason for follow up: Management of chronic kidney disease stage IIIb    Background:  Stephanie Appiah is a 40 y.o. female who got admitted to the hospital on 2025.    The patient has history of ADHD.  She had 2 pregnancies in the past and developed HELLP syndrome.  Both times she had to deliver her babies early.  She developed worsening of the kidney function both times.    She presented to the hospital complaining of pain involving the left lower rib area and extending to the back.  The pain was sharp and did not have any aggravating factors.  The pain was getting better whenever she lies on her left side.  The pain started about 4 days before admission.    The patient has been having serum creatinine around 1.3 to 1.6 mg/dL at least since 2019.    She had CT scan of the abdomen which was suggestive of chronic pancreatitis.  The patient has history of alcohol abuse.  There is a possibility of cirrhosis based on CT scan of the abdomen.    Interval history:  Chart was reviewed.  Events were noted.  The patient does not  have fever.  The lower rib pain is better.  She denies having any urinary symptoms.    Review of Systems:   14 point review of systems is otherwise negative except for mentioned above    Allergies:  Allergies   Allergen Reactions    Hydrocodone Itching    Insulin Detemir Other (See Comments)     Redness and itching at injection site       Objective     Vitals:   Temp:  [97.7 °F (36.5 °C)-98.6 °F (37 °C)] 98.6 °F (37 °C)  Heart Rate:  [84-87] 84  Resp:  [16-18] 16  BP: (116-133)/(76-83) 116/83    Intake/Output Summary (Last 24 hours) at 5/26/2025 0910  Last data filed at 5/26/2025 0847  Gross per 24 hour   Intake 480 ml   Output --   Net 480 ml       Physical Exam:   Constitutional: Awake, alert, no acute distress.  HEENT: Sclera anicteric, no conjunctival injection  Neck: Supple, no thyromegaly, no lymphadenopathy, trachea at midline, no JVD  Respiratory: Clear to auscultation bilaterally, nonlabored respiration  Cardiovascular: RRR, no murmurs, no rubs or gallops, no carotid bruit  Gastrointestinal: Positive bowel sounds, abdomen is soft, nontender and nondistended  : No palpable bladder  Musculoskeletal: No edema, no clubbing or cyanosis  Psychiatric: Appropriate affect, cooperative  Neurologic: Oriented x3, moving all extremities, normal speech and mental status  Skin: Warm and dry       Scheduled Meds:     famotidine, 40 mg, Oral, Daily  Lidocaine, 1 patch, Transdermal, Q24H  magnesium sulfate, 2 g, Intravenous, Q2H  thiamine, 100 mg, Oral, Daily      IV Meds:          Results Reviewed:   I have personally reviewed the results from the time of this admission to 5/26/2025 09:10 EDT     Lab Results   Component Value Date    GLUCOSE 125 (H) 05/26/2025    CALCIUM 8.6 05/26/2025     (L) 05/26/2025    K 4.1 05/26/2025    CO2 19.7 (L) 05/26/2025     05/26/2025    BUN 13 05/26/2025    CREATININE 1.67 (H) 05/26/2025    EGFRIFNONA 45 (L) 07/04/2020    BCR 7.8 05/26/2025    ANIONGAP 14.3 05/26/2025      Lab  Results   Component Value Date    MG 1.3 (L) 05/26/2025    PHOS 3.4 05/26/2025    ALBUMIN 3.3 (L) 05/26/2025     US Renal Bilateral  Result Date: 5/25/2025  US RENAL BILATERAL-  Clinical: Abnormal serum creatinine  COMPARISON CT abdomen performed earlier in the day at 5:03 p.m.  FINDINGS: The right kidney is 8.4 cm in length and the left kidney is 7.3 cm in length, both kidneys are small. No obstructive uropathy on the right or left. Only the right ureteral jet could be documented. No renal mass. There are areas of left renal cortical thinning and lobulation. The remainder is unremarkable.  This report was finalized on 5/25/2025 6:08 PM by Dr. Alec Benavidez M.D on Workstation: [id number]       I reviewed CT scan of the abdomen and pelvis results from 5/24/2025.  The patient has atrophic appearing left kidney and normal right kidney.  There was no evidence of hydronephrosis.    I reviewed renal ultrasound from May 25, 2025.  Both kidneys are smallish.  No evidence of hydronephrosis.  There is some cortical thinning in the left kidney.    Assessment / Plan     ASSESSMENT:  1.  Chronic kidney disease stage IIIb, most likely this is related to incomplete recovery of previous acute kidney injury that happened when she developed HELLP syndrome during her previous 2 pregnancies.  She does not have proteinuria  2.  Left rib pain  3.  Prediabetes  4.  Chronic pancreatitis based on CT scan results  5.  Cirrhosis, possibly related to alcohol abuse.  This is based on CT scan results  6.  Anemia, iron stores are acceptable    PLAN:  1.  Kidney function is stabilizing.  It will be monitored.  The patient does not have proteinuria  2.  Imaging was reviewed.  Appreciate GI input  3.  IV fluids were stopped.  The patient was encouraged to increase oral intake  4.  The patient will need better control of her blood sugar since she has prediabetes.  I discussed with her losing weight and following a low-carb diet  5.  I will monitor  hemoglobin.  Iron stores are acceptable    Thank you for involving us in the care of Stephanie Appiah.  Please feel free to call with any questions.    Lu Guillory MD  25  09:10 EDT    [facility]  [phone]      Much of this encounter note is an electronic transcription/translation of spoken language to printed text. The electronic translation of spoken language may permit erroneous, or at times, nonsensical words or phrases to be inadvertently transcribed; Although I have reviewed the note for such errors, some may still exist.    Electronically signed by Lu Giullory MD at 25 1206       Janes Mejia MD at 25 0958              Name: Stephanie Appiah ADMIT: 2025   : 1985  PCP: Provider, No Known    MRN: [id number] LOS: 1 days   AGE/SEX: 40 y.o. female  ROOM: [facility]     Subjective   Subjective   Still having quite a bit of pain left upper quadrant now radiating more laterally and into her back.  No associated nausea or vomiting.  No recent injury.  No rash.      Objective   Objective   Vital Signs  Temp:  [98.1 °F (36.7 °C)-98.9 °F (37.2 °C)] 98.2 °F (36.8 °C)  Heart Rate:  [] 86  Resp:  [16-18] 18  BP: (114-149)/(85-96) 127/85  SpO2:  [98 %-100 %] 98 %  on   ;   Device (Oxygen Therapy): room air  Body mass index is 24.2 kg/m².  Physical Exam  Vitals and nursing note reviewed.   Constitutional:       General: She is not in acute distress.  Cardiovascular:      Rate and Rhythm: Normal rate and regular rhythm.   Pulmonary:      Effort: Pulmonary effort is normal.      Breath sounds: Normal breath sounds.   Abdominal:      General: Bowel sounds are normal. There is no distension.      Palpations: Abdomen is soft.      Tenderness: There is abdominal tenderness in the left upper quadrant. There is guarding. There is no rebound.   Musculoskeletal:         General: No swelling.   Skin:     General: Skin is warm and dry.   Neurological:       Mental Status: She is alert. Mental status is at baseline.       Results Review     I reviewed the patient's new clinical results.  Results from last 7 days   Lab Units 05/25/25  0455 05/24/25  1237   WBC 10*3/mm3 5.40 6.49   HEMOGLOBIN g/dL 10.1* 10.9*   PLATELETS 10*3/mm3 142 152     Results from last 7 days   Lab Units 05/25/25  0455 05/24/25 1933 05/24/25  1237   SODIUM mmol/L 136 137 137   POTASSIUM mmol/L 3.8 3.4* 2.8*   CHLORIDE mmol/L 101 103 102   CO2 mmol/L 21.0* 22.9 24.3   BUN mg/dL 14 15 18   CREATININE mg/dL 1.60* 1.56* 1.71*   GLUCOSE mg/dL 166* 103* 168*   EGFR mL/min/1.73 41.4* 42.7* 38.2*     Results from last 7 days   Lab Units 05/25/25  0455 05/24/25  1237   ALBUMIN g/dL 3.3* 3.7   BILIRUBIN mg/dL 0.4 0.4   ALK PHOS U/L 157* 184*   AST (SGOT) U/L 24 21   ALT (SGPT) U/L 17 22     Results from last 7 days   Lab Units 05/25/25 0455 05/24/25 1933 05/24/25  1237   CALCIUM mg/dL 8.1* 8.7 8.9   ALBUMIN g/dL 3.3*  --  3.7   MAGNESIUM mg/dL 1.7 1.9 1.2*   PHOSPHORUS mg/dL 5.4* 1.7*  --        Results from last 7 days   Lab Units 05/24/25  1237   LIPASE U/L 41     Hemoglobin A1C   Date/Time Value Ref Range Status   05/25/2025 0455 5.90 (H) 4.80 - 5.60 % Final       CT Abdomen Pelvis Without Contrast  Result Date: 5/24/2025   1. Pancreatic and peripancreatic fat induration and with pancreatic calcifications. Possible acute on chronic pancreatitis. Recommend correlation with amylase/lipase levels. 2. Cirrhosis. 3. Cholelithiasis. 4. Suspect bilateral renal scarring with left renal atrophy. 5. Bladder wall thickening, may be related underdistention or cystitis in the appropriate clinical and laboratory setting. 6. No urolithiasis or hydronephrosis.  This report was finalized on 5/24/2025 1:53 PM by Dr. Trae Campo M.D on Workstation: [id number]        I have personally reviewed all medications:  Scheduled Medications  famotidine, 40 mg, Oral, Daily  Lidocaine, 1 patch, Transdermal, Q24H  sodium  chloride, 10 mL, Intravenous, Q12H  thiamine, 100 mg, Oral, Daily    Infusions  sodium chloride, 100 mL/hr, Last Rate: 100 mL/hr (05/24/25 2240)    Diet  Diet: Regular/House, Diabetic, Cardiac; Healthy Heart (2-3 Na+); Consistent Carbohydrate; Fluid Consistency: Thin (IDDSI 0)    I have personally reviewed:  [x]  Laboratory   [x]  Microbiology   [x]  Radiology   [x]  EKG/Telemetry  [x]  Cardiology/Vascular   []  Pathology    []  Records      Assessment/Plan     Active Hospital Problems    Diagnosis  POA    **LUQ abdominal pain [R10.12]  Yes    Stage 3a chronic kidney disease [N18.31]  Yes    Alcohol abuse [F10.10]  Yes    Macrocytic anemia [D53.9]  Yes    History of PIH (pregnancy induced hypertension) [O13.9]  Yes      Resolved Hospital Problems   No resolved problems to display.       40-year-old female with history of regular alcohol use presenting with severe LUQ and epigastric abdominal pain radiating to the back. No tenderness over ribs and no evidence of rash. Lipase is within normal limits, but non-contrast CT demonstrated pancreatic calcifications and induration of peripancreatic fat, suggestive of possible chronic pancreatitis. Also noted were cirrhotic morphology of the liver and moderate splenomegaly. No definitive acute process identified on the non-contrast study to explain her pain.    AFVSS  Normal O2 sats  INR 1.14  B12 and folic acid normal  Hemoglobin 10.1 down from 10.9  Creatinine 1.60 down from 1.71  HSV antibodies pending  Hepatitis studies pending  A1c 5.9%  Magnesium and phosphorus improved    CT abdomen/pelvis without contrast 5/24 possible acute on chronic pancreatitis with evidence cirrhosis and cholelithiasis, renal scarring possible cystitis      Etiology of LUQ pain unclear.  No tenderness over the ribs and no history of trauma.  Reviewed CT scan above however this was done without contrast.  Unusual that CT is showing possible chronic pancreatitis however patient denies any prior  knowledge of ever having pancreatitis.  Will proceed with contrasted CT abdomen and pelvis. Her renal function is abnormal but stable therefore appropriate for contrast at this time.  Will continue IV fluids for at least a few hours following procedure.  Nephrology has been consulted.    Contrasted CT will allow better evaluation of the spleen to assess for infarct, abscess, or vascular compromise as well as clarify pancreatic findings and rule out acute exacerbation, necrosis, or peripancreatic collections.  Given evidence of cirrhosis will also assess for portal hypertension and potential splenic vein thrombosis.    Contrast will also better evaluate renal parenchyma and vasculature more definitively, given left renal cortical loss and atrophy.    Will add Percocet for pain for now.    History of significant alcohol abuse however states she has not had anything in at least a week.  She ports typically drinking 2 to 3 glasses of wine per evening when she is preparing dinner.    Continue monitoring electrolytes    Follow-up renal and GI consults.      SCDs for DVT prophylaxis.  Full code.  Discussed with patient and nursing staff.  Anticipate discharge home in 1-2 days.  Expected Discharge Date: 5/26/2025; Expected Discharge Time:       Janes Mejia MD  [facility]  05/25/25  11:02 EDT    Electronically signed by Janes Mejia MD at 05/25/25 1102          Consult Notes (all)        Hitesh Owen, APRN at 05/26/25 0010       Attestation signed by Ken Carrillo MD at 05/26/25 0038      I have reviewed the chart, seen and examined patient with Hitesh Ivory  nurse practioner.  I have performed more that 75% of  assessment and medical decision making I have reviewed this documentation and agree.    Patient has  evidence of acute on chronic pancreatitis- she was eating a stuff potato and has zero discomfort.   She also has cirrhosis, presumably from etoh. Will check afp, may  needed eventual egd to screen for varices, [facility] takes over care. [facility]   Consult Note    Patient Name: Stephanie Appiah  : 1985  MRN: [id number]  Primary Care Physician:  Provider, No Known  Referring Physician: No Known Provider  Date of admission: 2025    Consults  Subjective   Subjective     Reason for Consult/ Chief Complaint: Abdominal pain    Abdominal Pain  Associated symptoms: constipation    Associated symptoms: no fever, no nausea and no vomiting      Stephanie Appiah is a 40 y.o. female presenting with epigastric left upper quadrant pain. Patient states she has had the symptoms for almost a week. She states it is sharp. Initially she noticed that nothing seemed to make it better or worse but then later noted that she was more comfortable lying on the left side. Otherwise it is not affected by position or movement. Not affected by oral intake or bowel movements.   She reports constipation and no having regular Bms for a couple days. She is tolerating regular food very promising. She denies nausea, vomiting or chills. No melena or hematochezia.     History of significant alcohol abuse however states she has not had anything in at least a week. She ports typically drinking 2 to 3 glasses of wine per evening when she is preparing dinner.        CT   1. Similar to the previous examination, there is peripancreatic phlegmon  consistent with pancreatitis. There is no focal peripancreatic fluid  collection seen. There are pancreatic calcifications, suspect chronic  pancreatitis. No pancreatic ductal dilatation. No pancreatic mass. The  stomach and duodenum have a satisfactory appearance. Gallstones and/or  sludge demonstrated within the dependent portion of the gallbladder,  there is mild gallbladder wall thickening; however, no peripancreatic  fluid or biliary duct dilatation.     2. Similar to the previous examination, there is surface irregularity of  the  liver, consistent with cirrhosis. The spleen does not appear grossly  enlarged. Both adrenal glands are normal. The left kidney is somewhat  lobulated in contour with small focal areas of cortical loss/thinning  seen. The right kidney is satisfactory in appearance. No calculus or  mass on the right or left, no obstructive uropathy seen. Diameter of the  aorta is normal. Retroaortic left renal vein, anatomic variants. There  is diffuse bladder wall thickening, no diverticulum or stone. The  ovaries are symmetric and satisfactory in appearance. There is a  metallic clip adjacent to the right adnexa, uterus is satisfactory in  size and shape.     3. The appendix, colon and small bowel are normal. No abnormality within  the left lower quadrant to explain the patient's discomfort. No free  intraperitoneal gas nor fluid seen.         Review of Systems   Constitutional:  Negative for fatigue and fever.   HENT:  Negative for trouble swallowing.    Gastrointestinal:  Positive for abdominal pain and constipation. Negative for blood in stool, nausea and vomiting.        Personal History     Past Medical History:   Diagnosis Date    ACL tear     ADHD     Disease of thyroid gland     PCL deficiency, knee, left     Torn meniscus        Past Surgical History:   Procedure Laterality Date    BREAST AUGMENTATION  2004     SECTION      KNEE SURGERY         Family History: family history includes Breast cancer in her maternal grandmother; No Known Problems in her father and mother; Ovarian cancer in her maternal aunt. Otherwise pertinent FHx was reviewed and not pertinent to current issue.    Social History:  reports that she has never smoked. She has never been exposed to tobacco smoke. She has never used smokeless tobacco. She reports current alcohol use of about 8.0 standard drinks of alcohol per week. She reports that she does not use drugs.    Home Medications:        Allergies:  Allergies   Allergen Reactions     Hydrocodone Itching    Insulin Detemir Other (See Comments)     Redness and itching at injection site       Objective    Objective     Vitals:  Temp:  [97.7 °F (36.5 °C)-98.2 °F (36.8 °C)] 97.7 °F (36.5 °C)  Heart Rate:  [84-87] 87  Resp:  [16-18] 16  BP: (123-133)/(76-85) 133/81    Physical Exam  Constitutional:       General: She is not in acute distress.  Cardiovascular:      Rate and Rhythm: Normal rate.      Pulses: Normal pulses.   Pulmonary:      Effort: Pulmonary effort is normal.   Abdominal:      General: Bowel sounds are normal.      Palpations: Abdomen is soft.   Skin:     Coloration: Skin is not jaundiced.      Findings: No bruising.   Neurological:      General: No focal deficit present.      Mental Status: She is alert.   Psychiatric:         Mood and Affect: Mood normal.         Result Review    Result Review:  I have personally reviewed the results from the time of this admission to 5/26/2025 00:10 EDT and agree with these findings:  [x]  Laboratory list / accordion  [x]  Microbiology  [x]  Radiology  []  EKG/Telemetry   []  Cardiology/Vascular   []  Pathology  []  Old records  []  Other:  Most notable findings include:   AST 21, 25  ALT 22, 17  Plt 152, 142  TB 0.4, 0.4  -H    Assessment & Plan   Assessment / Plan     Brief Patient Summary:  Stephanie Appiah is a 40 y.o. female who presents with:  .- Abdominal pain. LUQ  ..-ETOH  .-Cirrhosis on CT scan  .- Anemia      Active Hospital Problems:  Active Hospital Problems    Diagnosis     **LUQ abdominal pain     Stage 3a chronic kidney disease     Alcohol abuse     Macrocytic anemia     History of PIH (pregnancy induced hypertension)      Plan:   .- Cirrhosis. Noted on CT scan. Hep labs negative. LFTs normal. GGT +  .-Replace electrolytes as needed.   .- Iron labs normal.   .- Cholelithiasis: No acute cholecystitis. Could consider HIDA in the near future.   .- Pt tolerating diet  .- GI following.     Hitesh Geronimo,  APRN      Electronically signed by Ken Carrillo MD at 25 0038       Lu Guillory MD at 25 1349        Consult Orders    1. Inpatient Nephrology Consult [[id number]] ordered by Lu Guillory MD at 25 0837                   [facility] Consult Note      Patient Name: Stephanie Appiah  : 1985  MRN: [id number]  Primary Care Physician:  Provider, No Known  Referring Physician: No Known Provider  Date of admission: 2025    Subjective     Reason for Consult: Management of chronic kidney disease stage IIIb    HPI:   Stephanie Appiah is a 40 y.o. female who got admitted to the hospital on 2025.    The patient has history of ADHD.  She had 2 pregnancies in the past and developed HELLP syndrome.  Both times she had to deliver her babies early.  She developed worsening of the kidney function both times.    She presented to the hospital complaining of pain involving the left lower rib area and extending to the back.  The pain was sharp and did not have any aggravating factors.  The pain was getting better whenever she lies on her left side.  The pain started about 4 days before admission.    The patient has been having serum creatinine around 1.3 to 1.6 mg/dL at least since 2019.    She had CT scan of the abdomen which was suggestive of chronic pancreatitis.  The patient has history of alcohol abuse.  There is a possibility of cirrhosis based on CT scan of the abdomen.    The patient does not have fever.  The lower rib pain is better.  She denies having any urinary symptoms.    Review of Systems:   14 point review of systems is otherwise negative except for mentioned above on HPI    Personal History     Past Medical History:   Diagnosis Date    ACL tear     ADHD     Disease of thyroid gland     PCL deficiency, knee, left     Torn meniscus        Past Surgical History:   Procedure Laterality Date    BREAST AUGMENTATION        SECTION      KNEE SURGERY         Family History: family history includes Breast cancer in her maternal grandmother; No Known Problems in her father and mother; Ovarian cancer in her maternal aunt.    Social History:  reports that she has never smoked. She has never been exposed to tobacco smoke. She has never used smokeless tobacco. She reports current alcohol use of about 8.0 standard drinks of alcohol per week. She reports that she does not use drugs.    Home Medications:  Prior to Admission medications    Not on File       Allergies:  Allergies   Allergen Reactions    Hydrocodone Itching    Insulin Detemir Other (See Comments)     Redness and itching at injection site       Objective     Vitals:   Temp:  [97.7 °F (36.5 °C)-98.9 °F (37.2 °C)] 97.7 °F (36.5 °C)  Heart Rate:  [] 86  Resp:  [18] 18  BP: (114-137)/(76-95) 124/76    Intake/Output Summary (Last 24 hours) at 2025 1349  Last data filed at 2025 0646  Gross per 24 hour   Intake 1950 ml   Output --   Net 1950 ml       Physical Exam:   Constitutional: Awake, alert, no acute distress.  HEENT: Sclera anicteric, no conjunctival injection  Neck: Supple, no thyromegaly, no lymphadenopathy, trachea at midline, no JVD  Respiratory: Clear to auscultation bilaterally, nonlabored respiration  Cardiovascular: RRR, no murmurs, no rubs or gallops, no carotid bruit  Gastrointestinal: Positive bowel sounds, abdomen is soft, nontender and nondistended  : No palpable bladder  Musculoskeletal: No edema, no clubbing or cyanosis  Psychiatric: Appropriate affect, cooperative  Neurologic: Oriented x3, moving all extremities, normal speech and mental status  Skin: Warm and dry       Scheduled Meds:     famotidine, 40 mg, Oral, Daily  Lidocaine, 1 patch, Transdermal, Q24H  thiamine, 100 mg, Oral, Daily      IV Meds:   sodium chloride, 100 mL/hr, Last Rate: 100 mL/hr (25 2240)        Results Reviewed:   I have personally reviewed the results from the  time of this admission to 5/25/2025 13:49 EDT     Lab Results   Component Value Date    GLUCOSE 166 (H) 05/25/2025    CALCIUM 8.1 (L) 05/25/2025     05/25/2025    K 3.8 05/25/2025    CO2 21.0 (L) 05/25/2025     05/25/2025    BUN 14 05/25/2025    CREATININE 1.60 (H) 05/25/2025    EGFRIFNONA 45 (L) 07/04/2020    BCR 8.8 05/25/2025    ANIONGAP 14.0 05/25/2025      Lab Results   Component Value Date    MG 1.7 05/25/2025    PHOS 5.4 (H) 05/25/2025    ALBUMIN 3.3 (L) 05/25/2025         I reviewed CT scan of the abdomen and pelvis results from 5/24/2025.  The patient has atrophic appearing left kidney and normal right kidney.  There was no evidence of hydronephrosis.    Assessment / Plan     ASSESSMENT:  1.  Chronic kidney disease stage IIIb, most likely this is related to incomplete recovery of previous acute kidney injury that happened when she developed HELLP syndrome during her previous 2 pregnancies  2.  Left rib pain  3.  Prediabetes  4.  Chronic pancreatitis based on CT scan results  5.  Cirrhosis, possibly related to alcohol abuse.  This is based on CT scan results  6.  Anemia    PLAN:  1.  I will check urine sodium and urine protein to creatinine ratio  2.  I will check kidney ultrasound  3.  IV fluids will be stopped  4.  The patient will need better control of her blood sugar since she has prediabetes.  I discussed with her losing weight and following a low-carb diet  5.  I will monitor hemoglobin.  I will check iron studies and ferritin    I discussed with hospital medicine.  I discussed with the patient and her .    Thank you for involving us in the care of Stephanie Appiah.  Please feel free to call with any questions.    Lu Guillory MD  05/25/25  13:49 EDT    [facility]  [phone]      Much of this encounter note is an electronic transcription/translation of spoken language to printed text. The electronic translation of spoken language may permit  erroneous, or at times, nonsensical words or phrases to be inadvertently transcribed; Although I have reviewed the note for such errors, some may still exist.    Electronically signed by Lu Guillory MD at 05/26/25 7093

## 2025-05-27 NOTE — OUTREACH NOTE
Prep Survey      Flowsheet Row Responses   [facility] facility patient discharged from? [facility]   Is LACE score < 7 ? No   Eligibility Readm Mgmt   Discharge diagnosis LUQ abdominal pain   Does the patient have one of the following disease processes/diagnoses(primary or secondary)? Other   Does the patient have Home health ordered? No   Is there a DME ordered? No   Medication alerts for this patient see avs   Prep survey completed? Yes            Izabela ARMANDO - Registered Nurse

## 2025-05-28 LAB
HBV CORE AB SERPL QL IA: NEGATIVE
HBV DNA SERPL NAA+PROBE-ACNC: NORMAL IU/ML
HBV DNA SERPL NAA+PROBE-LOG IU: NORMAL LOG10 IU/ML
HSV1 IGG SERPL QL IA: REACTIVE
HSV2 IGG SERPL QL IA: NON REACTIVE
MITOCHONDRIA M2 IGG SER-ACNC: <20 UNITS (ref 0–20)
REF LAB TEST REF RANGE: NORMAL

## 2025-05-29 NOTE — PAYOR COMM NOTE
"Stephanie Rachel (40 y.o. Female)      PATIENT DISCHARGED 2025 :  REF#  [id number]     UR: FAX-  [phone]      YD-[phone]     [facility]: NPI [id number]  [facility]# [id number]     LENORE OLSEN RN,CCP     Date of Birth   1985    Social Security Number       Address   [address] [place] [address]    Home Phone   [phone]    MRN   [id number]       Amish   None    Marital Status                               Admission Date   2025    Admission Type   Urgent    Admitting Provider   Luis Davis MD    Attending Provider       Department, Room/Bed   [address], [address]/       Discharge Date   2025    Discharge Disposition   Home or Self Care    Discharge Destination                                 Attending Provider: (none)   Allergies: Hydrocodone, Insulin Detemir    Isolation: None   Infection: None   Code Status: Prior    Ht: 154.9 cm (61\")   Wt: 58.1 kg (128 lb 1.4 oz)    Admission Cmt: None   Principal Problem: LUQ abdominal pain [R10.12]                   Active Insurance as of 2025       Primary Coverage       Payor Plan Insurance Group Employer/Plan Group    [employer] EMPLOYEE [id number]       Payor Plan Address Payor Plan Phone Number Payor Plan Fax Number Effective Dates    [address] [phone]  2025 - None Entered    [place] [address]         Subscriber Name Subscriber Birth Date Member ID       STEPHANIE RACHEL 1985 [id number]                     Emergency Contacts        (Rel.) Home Phone Work Phone Mobile Phone    Satish Watkins (Spouse) [phone] -- [phone]                 Discharge Summary        Tommy Dukes MD at 25 1330              Patient Name: Stephanie Rachel  : 1985  MRN: [id number]    Date of Admission: 2025  Date of Discharge:  2025  Primary Care Physician: Provider, No " "Known      Chief Complaint:   Abdominal Pain      Discharge Diagnoses     Active Hospital Problems    Diagnosis  POA    **LUQ abdominal pain [R10.12]  Yes    Stage 3a chronic kidney disease [N18.31]  Yes    Alcohol abuse [F10.10]  Yes    Macrocytic anemia [D53.9]  Yes    History of PIH (pregnancy induced hypertension) [O13.9]  Yes      Resolved Hospital Problems   No resolved problems to display.        Admitting HPI     \"Pleasant 40-year-old female states she woke up Monday morning with sharp pain and she points to the left lower rib area about mid clavicular line on the left and extending back to the posterior axillary line. She states it is sharp. Initially she noticed that nothing seemed to make it better or worse but then later noted that she was more comfortable lying on the left side. Otherwise it is not affected by position or movement. Not affected by oral intake or bowel movements. She noted that on Tuesday she she had a few dry heaves. Bowel movements were normal until she decided to take a laxative in case this was constipation. That resulted in a loose stool. Last bowel movement 2 days ago. No fever sweats or chills. She was seen at an urgent care center 2 days ago and was given an antibiotic and is unclear as to why the antibiotic was prescribed. She states she knows that she drinks too much. She has about 24 drinks a week. She states she has never been through alcohol withdrawal. She stopped drinking when she noted the pain earlier this week. On questioning she states she is also never had pancreatitis. On questioning still noting the left rib pain. \"    Hospital Course     Pt admitted for abdominal pain, imaging showing acute on pancreatitis as well as cirrhosis.  She was seen in consultation with GI and nephrology.  Discussed with patient and she noted years long history of drinking a bottle of wine with dinner, discussed complete cessation moving forward.  Her abdominal pain improved with " supportive care and she is now tolerating diet.  She will need follow-up with GI.  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  Noted to have no proteinuria on her urinalysis.  Patient is feeling much better is requesting discharge.  Given her improvement in abdominal pain no acute medical reason to remain in the hospital.  TSH was found to be mildly elevated and will defer to PCP for ongoing management.    Day of Discharge     Physical Exam:  Temp:  [97.7 °F (36.5 °C)-98.6 °F (37 °C)] 98.6 °F (37 °C)  Heart Rate:  [84-87] 84  Resp:  [16-18] 16  BP: (116-133)/(81-83) 116/83  Body mass index is 24.2 kg/m².  Physical Exam  Constitutional:       General: She is not in acute distress.     Appearance: She is ill-appearing.   Pulmonary:      Effort: Pulmonary effort is normal. No respiratory distress.      Breath sounds: No stridor.   Skin:     Coloration: Skin is not jaundiced.      Findings: No bruising.   Neurological:      General: No focal deficit present.      Mental Status: She is alert.   Psychiatric:         Mood and Affect: Mood normal.         Behavior: Behavior normal.         Consultants     Consult Orders (all) (From admission, onward)       Start     Ordered    05/25/25 0838  Inpatient Nephrology Consult  Once        Specialty:  Nephrology  Provider:  Corey Lucas MD    05/25/25 0837    05/25/25 0512  Inpatient Nephrology Consult  Once,   Status:  Canceled        Specialty:  Nephrology  Provider:  Thor Alexander MD    05/25/25 0512 05/24/25 2033  Inpatient Gastroenterology Consult  Once        Specialty:  Gastroenterology  Provider:  Hermes Buckner MD    05/24/25 2032 05/24/25 2032  Inpatient Nephrology Consult  Once,   Status:  Canceled        Specialty:  Nephrology  Provider:  (Not yet assigned)    05/24/25 2032 05/24/25 2014  Inpatient Access Center Consult  Once         Provider:  (Not yet assigned)    05/24/25 2020                  Procedures     * Surgery not found *      Imaging Results (All)       Procedure Component Value Units Date/Time    US Renal Bilateral [[id number]] Collected: 05/25/25 1805     Updated: 05/25/25 1811    Narrative:      US RENAL BILATERAL-     Clinical: Abnormal serum creatinine     COMPARISON CT abdomen performed earlier in the day at 5:03 p.m.     FINDINGS: The right kidney is 8.4 cm in length and the left kidney is  7.3 cm in length, both kidneys are small. No obstructive uropathy on the  right or left. Only the right ureteral jet could be documented. No renal  mass. There are areas of left renal cortical thinning and lobulation.  The remainder is unremarkable.     This report was finalized on 5/25/2025 6:08 PM by Dr. Alec Benavidez M.D  on Workstation: [id number]       CT Abdomen Pelvis With Contrast [[id number]] Collected: 05/25/25 1729     Updated: 05/25/25 1755    Narrative:      CT ABDOMEN PELVIS W CONTRAST-     Radiation dose reduction techniques were utilized, including automated  exposure control and exposure modulation based on body size.     CLINICAL: Left lower quadrant abdominal pain.     COMPARISON: 05/24/2025 CT abdomen and pelvis.     FINDINGS:  1. Similar to the previous examination, there is peripancreatic phlegmon  consistent with pancreatitis. There is no focal peripancreatic fluid  collection seen. There are pancreatic calcifications, suspect chronic  pancreatitis. No pancreatic ductal dilatation. No pancreatic mass. The  stomach and duodenum have a satisfactory appearance. Gallstones and/or  sludge demonstrated within the dependent portion of the gallbladder,  there is mild gallbladder wall thickening; however, no peripancreatic  fluid or biliary duct dilatation.     2. Similar to the previous examination, there is surface irregularity of  the liver, consistent with cirrhosis. The spleen does not appear grossly  enlarged. Both  adrenal glands are normal. The left kidney is somewhat  lobulated in contour with small focal areas of cortical loss/thinning  seen. The right kidney is satisfactory in appearance. No calculus or  mass on the right or left, no obstructive uropathy seen. Diameter of the  aorta is normal. Retroaortic left renal vein, anatomic variants. There  is diffuse bladder wall thickening, no diverticulum or stone. The  ovaries are symmetric and satisfactory in appearance. There is a  metallic clip adjacent to the right adnexa, uterus is satisfactory in  size and shape.     3. The appendix, colon and small bowel are normal. No abnormality within  the left lower quadrant to explain the patient's discomfort. No free  intraperitoneal gas nor fluid seen.     CONCLUSION: Pancreatitis similar to 05/24/2025, no abnormality in the  left lower quadrant. There is bladder wall thickening as before,  possible cystitis. Uterus and ovaries are satisfactory in appearance.  Liver morphology consistent with cirrhosis. Gallstones and/or sludge  with gallbladder wall thickening.              This report was finalized on 5/25/2025 5:52 PM by Dr. Alec Benavidez M.D  on Workstation: [id number]       CT Abdomen Pelvis Without Contrast [[id number]] Collected: 05/24/25 1342     Updated: 05/24/25 1356    Narrative:      CT ABDOMEN PELVIS WO CONTRAST-     INDICATION: Flank pain. Suspect kidney stone.     COMPARISON: CTA chest July 4, 2020     TECHNIQUE:  Routine CT abdomen/pelvis without IV contrast. Coronal and sagittal  reformats. Radiation dose reduction techniques were utilized, including  automated exposure control and exposure modulation based on body size.     FINDINGS:      Lung bases: Bilateral breast implants.     ABDOMEN: Cirrhotic morphology. Cholelithiasis. No biliary ductal  dilatation. Spleen is at upper limits in size. Moderate to severe  pancreatic lipomatosis. Pancreatic calcifications. Induration in the  pancreatic body and the adjacent  peripancreatic fat. No pancreatic  ductal dilatation or mass seen. No adrenal nodules. Lobular renal  contours with multifocal cortical loss seen in the left kidney, suspect  scarring. Atrophic appearing left kidney measuring 7.3 cm. Right kidney  measures 9.2 cm in craniocaudal dimension. No urolithiasis. No  hydronephrosis.     Pelvis: Bladder wall thickening, with under distention. No bladder  calculus. Uterus is anteverted. Tubal ligation clips with the left tubal  ligation clip seen in the cul-de-sac, removed from the expected location  of the left lobe into. Trace fluid in the cul-de-sac, within physiologic  limits.     Bowel: No small bowel obstruction. Normal appendix.     Abdominal wall: Tiny fat-containing umbilical hernia. Pelvic wall  scarring.     Retroperitoneum: No lymphadenopathy.     Vasculature: Prominent umbilical vein. No abdominal aortic aneurysm.  Circumaortic left renal vein.     Osseous structures: No destructive osseous lesions.       Impression:         1. Pancreatic and peripancreatic fat induration and with pancreatic  calcifications. Possible acute on chronic pancreatitis. Recommend  correlation with amylase/lipase levels.  2. Cirrhosis.  3. Cholelithiasis.  4. Suspect bilateral renal scarring with left renal atrophy.  5. Bladder wall thickening, may be related underdistention or cystitis  in the appropriate clinical and laboratory setting.  6. No urolithiasis or hydronephrosis.     This report was finalized on 5/24/2025 1:53 PM by Dr. Trae Campo M.D on Workstation: [id number]                 Pertinent Labs     Results from last 7 days   Lab Units 05/26/25  0145 05/25/25  0455 05/24/25  1237   WBC 10*3/mm3 5.33 5.40 6.49   HEMOGLOBIN g/dL 9.9* 10.1* 10.9*   PLATELETS 10*3/mm3 175 142 152     Results from last 7 days   Lab Units 05/26/25  0145 05/25/25  0455 05/24/25  1933 05/24/25  1237   SODIUM mmol/L 134* 136 137 137   POTASSIUM mmol/L 4.1 3.8 3.4* 2.8*   CHLORIDE mmol/L 100  "101 103 102   CO2 mmol/L 19.7* 21.0* 22.9 24.3   BUN mg/dL 13 14 15 18   CREATININE mg/dL 1.67* 1.60* 1.56* 1.71*   GLUCOSE mg/dL 125* 166* 103* 168*   EGFR mL/min/1.73 39.3* 41.4* 42.7* 38.2*     Results from last 7 days   Lab Units 05/26/25  0145 05/25/25  0455 05/24/25  1237   ALBUMIN g/dL 3.3* 3.3* 3.7   BILIRUBIN mg/dL 0.3 0.4 0.4   ALK PHOS U/L 174* 157* 184*   AST (SGOT) U/L 23 24 21   ALT (SGPT) U/L 20 17 22     Results from last 7 days   Lab Units 05/26/25  0145 05/25/25  0455 05/24/25  1933 05/24/25  1237   CALCIUM mg/dL 8.6 8.1* 8.7 8.9   ALBUMIN g/dL 3.3* 3.3*  --  3.7   MAGNESIUM mg/dL 1.3* 1.7 1.9 1.2*   PHOSPHORUS mg/dL 3.4 5.4* 1.7*  --      Results from last 7 days   Lab Units 05/26/25  0145 05/24/25  1237   LIPASE U/L 24 41     Results from last 7 days   Lab Units 05/24/25  1933   CK TOTAL U/L 39     Results from last 7 days   Lab Units 05/25/25  1648   SODIUM UR mmol/L 86   CREATININE UR mg/dL 25.1   PROTEIN TOTAL URINE mg/dL <4.0         Invalid input(s): \"LDLCALC\"          Test Results Pending at Discharge     Pending Labs       Order Current Status    HSV 1 & 2 - Specific Antibody, IgG In process    Hepatitis B Core Antibody, Total In process    Hepatitis B DNA, Quantitative, PCR In process    Mitochondrial Antibodies, M2 In process            Discharge Details        Discharge Medications        New Medications        Instructions Start Date   oxyCODONE-acetaminophen 5-325 MG per tablet  Commonly known as: PERCOCET   1 tablet, Oral, Every 4 Hours PRN               Allergies   Allergen Reactions    Hydrocodone Itching    Insulin Detemir Other (See Comments)     Redness and itching at injection site       Discharge Disposition:  Home or Self Care      Discharge Diet:  Diet Order   Procedures    Diet: Regular/House; Fluid Consistency: Thin (IDDSI 0)       Discharge Activity:   Activity Instructions       Activity as Tolerated              CODE STATUS:    Code Status and Medical Interventions: CPR " (Attempt to Resuscitate); Full Support   Ordered at: 05/24/25 2020     Code Status (Patient has no pulse and is not breathing):    CPR (Attempt to Resuscitate)     Medical Interventions (Patient has pulse or is breathing):    Full Support       No future appointments.   Follow-up Information       Provider, No Known .    Contact information:  [facility] [id number] [phone]               Susana Arteaga PA-C Follow up in 1 month(s).    Specialties: Physician Assistant, Gastroenterology  Contact information:  [address]  [address]  [facility] [id number] [phone]               Lu Guillory MD Follow up.    Specialty: Nephrology  Contact information:  [address]  [address]  [facility] [id number] [phone]                             Time Spent on Discharge:  Greater than 30 minutes spent on discharge management including final examination, discussion of hospital stay and patient education, preparation of records, medication reconciliation, follow up planning      Tommy Dukes MD  [facility]  05/26/25  13:30 EDT                Electronically signed by Tommy Dukes MD at 05/26/25 1334       Discharge Order (From admission, onward)       Start     Ordered    05/26/25 1329  Discharge patient  Once        Expected Discharge Date: 05/26/25   Discharge Disposition: Home or Self Care   Physician of Record for Attribution - Please select from Treatment Team: TOMMY DUKES [[id number]]   Review needed by CMO to determine Physician of Record: No      Question Answer Comment   Physician of Record for Attribution - Please select from Treatment Team TOMMY DUKES    Review needed by CMO to determine Physician of Record No        05/26/25 1321

## 2025-05-30 ENCOUNTER — READMISSION MANAGEMENT (OUTPATIENT)
Dept: CALL CENTER | Facility: HOSPITAL | Age: 40
End: 2025-05-30
Payer: COMMERCIAL

## 2025-05-30 NOTE — OUTREACH NOTE
Medical Week 1 Survey      Flowsheet Row Responses   [facility] patient discharged from? [place]   Does the patient have one of the following disease processes/diagnoses(primary or secondary)? Other   Week 1 attempt successful? No   Unsuccessful attempts Attempt 1            Preeti Santizo Registered Nurse

## 2025-06-04 ENCOUNTER — READMISSION MANAGEMENT (OUTPATIENT)
Dept: CALL CENTER | Facility: HOSPITAL | Age: 40
End: 2025-06-04
Payer: COMMERCIAL

## 2025-06-04 NOTE — OUTREACH NOTE
Medical Week 1 Survey      Flowsheet Row Responses   [facility] patient discharged from? [place]   Does the patient have one of the following disease processes/diagnoses(primary or secondary)? Other   Week 1 attempt successful? No   Unsuccessful attempts Attempt 2            Den ANGULO - Registered Nurse

## 2025-06-09 ENCOUNTER — READMISSION MANAGEMENT (OUTPATIENT)
Dept: CALL CENTER | Facility: HOSPITAL | Age: 40
End: 2025-06-09
Payer: COMMERCIAL

## 2025-06-09 NOTE — OUTREACH NOTE
Medical Week 1 Survey      Flowsheet Row Responses   [facility] patient discharged from? [place]   Does the patient have one of the following disease processes/diagnoses(primary or secondary)? Other   Week 1 attempt successful? No   Unsuccessful attempts Attempt 3            Ester WATSON - Registered Nurse